# Patient Record
Sex: MALE | Race: WHITE | HISPANIC OR LATINO | Employment: FULL TIME | URBAN - METROPOLITAN AREA
[De-identification: names, ages, dates, MRNs, and addresses within clinical notes are randomized per-mention and may not be internally consistent; named-entity substitution may affect disease eponyms.]

---

## 2017-01-06 ENCOUNTER — OFFICE VISIT (OUTPATIENT)
Dept: INTERNAL MEDICINE | Facility: CLINIC | Age: 57
End: 2017-01-06
Payer: COMMERCIAL

## 2017-01-06 VITALS
TEMPERATURE: 97 F | SYSTOLIC BLOOD PRESSURE: 160 MMHG | BODY MASS INDEX: 28.87 KG/M2 | WEIGHT: 173.31 LBS | HEART RATE: 85 BPM | OXYGEN SATURATION: 97 % | DIASTOLIC BLOOD PRESSURE: 100 MMHG | HEIGHT: 65 IN

## 2017-01-06 DIAGNOSIS — I10 ESSENTIAL HYPERTENSION: Primary | ICD-10-CM

## 2017-01-06 DIAGNOSIS — Z00.00 PREVENTATIVE HEALTH CARE: ICD-10-CM

## 2017-01-06 DIAGNOSIS — B35.3 TINEA PEDIS OF BOTH FEET: ICD-10-CM

## 2017-01-06 PROCEDURE — 99213 OFFICE O/P EST LOW 20 MIN: CPT | Mod: S$GLB,,, | Performed by: INTERNAL MEDICINE

## 2017-01-06 PROCEDURE — 3080F DIAST BP >= 90 MM HG: CPT | Mod: S$GLB,,, | Performed by: INTERNAL MEDICINE

## 2017-01-06 PROCEDURE — 1159F MED LIST DOCD IN RCRD: CPT | Mod: S$GLB,,, | Performed by: INTERNAL MEDICINE

## 2017-01-06 PROCEDURE — 99999 PR PBB SHADOW E&M-EST. PATIENT-LVL III: CPT | Mod: PBBFAC,,, | Performed by: INTERNAL MEDICINE

## 2017-01-06 PROCEDURE — 3077F SYST BP >= 140 MM HG: CPT | Mod: S$GLB,,, | Performed by: INTERNAL MEDICINE

## 2017-01-06 RX ORDER — AMLODIPINE BESYLATE 10 MG/1
10 TABLET ORAL DAILY
Qty: 30 TABLET | Refills: 1 | Status: SHIPPED | OUTPATIENT
Start: 2017-01-06 | End: 2018-01-05

## 2017-01-06 RX ORDER — HYDROCHLOROTHIAZIDE 12.5 MG/1
12.5 CAPSULE ORAL DAILY
Qty: 30 CAPSULE | Refills: 1 | Status: SHIPPED | OUTPATIENT
Start: 2017-01-06 | End: 2018-01-05

## 2017-01-06 RX ORDER — CICLOPIROX OLAMINE 7.7 MG/G
CREAM TOPICAL 2 TIMES DAILY
Qty: 90 G | Refills: 2 | Status: SHIPPED | OUTPATIENT
Start: 2017-01-06 | End: 2020-02-21 | Stop reason: SDUPTHER

## 2017-01-06 NOTE — MR AVS SNAPSHOT
[facility]  [address] [facility] [place] [address]  Phone: [phone]  Fax: [phone]                  David Salter   2017 1:40 PM   Office Visit    Description:  Male : 1960   Provider:  Pardeep Rodriguez MD   Department:  [facility] - Internal Medicine           Reason for Visit     Hypertension     Headache           Diagnoses this Visit        Comments    Essential hypertension    -  Primary     Tinea pedis of both feet         Preventative health care                To Do List           Future Appointments        Provider Department Dept Phone    2017 8:20 AM LABORATORY, [facility] [phone]    2017 1:40 PM Pardeep Rodriguez MD [facility] [phone]      Goals (5 Years of Data)     None      Follow-Up and Disposition     Return in about 2 weeks (around 2017), or if symptoms worsen or fail to improve.       These Medications        Disp Refills Start End    amlodipine (NORVASC) 10 MG tablet 30 tablet 1 2017    Take 1 tablet (10 mg total) by mouth once daily. - Oral    Pharmacy: [facility] [address] [place], [place] [address] - [address] AT [facility] Ph #: [phone]       hydrochlorothiazide (MICROZIDE) 12.5 mg capsule 30 capsule 1 2017    Take 1 capsule (12.5 mg total) by mouth once daily. - Oral    Pharmacy: [facility] [address] [place], [place] [address] - [address] [address] AT [facility] Ph #: [phone]       ciclopirox (LOPROX) 0.77 % Crea 90 g 2 2017     Apply topically 2 (two) times daily. - Topical (Top)    Pharmacy: [facility] [address] [place], [place] [address] - [address] [address] AT [facility] Ph #: [phone]         [facility] On [facility]     [facility] Care [facility] -  Assistance  Registered nurses in the [facility] provide clinical advisement, health education, appointment booking, and other advisory services.  Call for  this free service at [phone].             Medications           Message regarding Medications     Verify the changes and/or additions to your medication regime listed below are the same as discussed with your clinician today.  If any of these changes or additions are incorrect, please notify your healthcare provider.        START taking these NEW medications        Refills    amlodipine (NORVASC) 10 MG tablet 1    Sig: Take 1 tablet (10 mg total) by mouth once daily.    Class: Normal    Route: Oral    hydrochlorothiazide (MICROZIDE) 12.5 mg capsule 1    Sig: Take 1 capsule (12.5 mg total) by mouth once daily.    Class: Normal    Route: Oral      CHANGE how you are taking these medications     Start Taking Instead of    ciclopirox (LOPROX) 0.77 % Crea ciclopirox (LOPROX) 0.77 % Crea    Dosage:  Apply topically 2 (two) times daily. Dosage:  Apply topically 2 (two) times daily.    Reason for Change:  Patient no longer taking       STOP taking these medications     esomeprazole (NEXIUM) 40 MG capsule Take 1 capsule (40 mg total) by mouth before breakfast.    fluticasone (FLONASE) 50 mcg/actuation nasal spray 1 spray by Each Nare route once daily.           Verify that the below list of medications is an accurate representation of the medications you are currently taking.  If none reported, the list may be blank. If incorrect, please contact your healthcare provider. Carry this list with you in case of emergency.           Current Medications     losartan (COZAAR) 25 MG tablet Take 50 mg by mouth once daily.     amlodipine (NORVASC) 10 MG tablet Take 1 tablet (10 mg total) by mouth once daily.    ciclopirox (LOPROX) 0.77 % Crea Apply topically 2 (two) times daily.    hydrochlorothiazide (MICROZIDE) 12.5 mg capsule Take 1 capsule (12.5 mg total) by mouth once daily.           Clinical Reference Information           Vital Signs - Last Recorded  Most recent update: 1/6/2017  2:02 PM by Karla Be MA    BP  "Pulse Temp Ht Wt SpO2    (!) 160/100 (BP Location: Right arm, Patient Position: Sitting) 85 97.2 °F (36.2 °C) (Tympanic) 5' 5" (1.651 m) 78.6 kg (173 lb 4.5 oz) 97%    BMI                28.84 kg/m2          Blood Pressure          Most Recent Value    BP  (!)  160/100      Allergies as of 1/6/2017     No Known Allergies      Immunizations Administered on Date of Encounter - 1/6/2017     None      Orders Placed During Today's Visit     Future Labs/Procedures Expected by Expires    CBC auto differential  1/6/2017 3/7/2018    Comprehensive metabolic panel  1/6/2017 3/7/2018    Lipid panel  1/6/2017 3/7/2018    PSA, Screening  1/6/2017 3/7/2018    TSH  1/6/2017 3/7/2018    Vitamin D  1/6/2017 3/7/2018      [facility] Sign-Up     Activating your [facility] account is as easy as 1-2-3!     1) Visit [email or web address], select Sign Up Now, enter this activation code and your date of birth, then select Next.  [email or web address]  Expires: 2/20/2017  2:50 PM      2) Create a username and password to use when you visit [facility] in the future and select a security question in case you lose your password and select Next.    3) Enter your e-mail address and click Sign Up!    Additional Information  If you have questions, please e-mail [email or web address] or call [phone] to talk to our [facility] staff. Remember, [facility] is NOT to be used for urgent needs. For medical emergencies, dial 911.         "

## 2017-01-06 NOTE — PROGRESS NOTES
"Subjective:      Patient ID: David Salter is a 56 y.o. male.    Chief Complaint: Hypertension (elevated x 2-3 weeks) and Headache    HPI Comments: 57 yo with Patient Active Problem List:     Essential hypertension     Vitamin D deficiency     Elevated BP     Gastroesophageal reflux disease    Here today c/o increased bp over past 2 weeks. Highest 169/104. Tried to increase losartan to 50 mg once daily. bp and elevated bp resolves with 50 mg losartan but then returns at end of day. No cp, syncope, blurred vision.  She also reports that a Loprox improved his tinea pedis in the past but flared again when ran out of cream.    Hypertension   Associated symptoms include headaches. Pertinent negatives include no chest pain, palpitations or shortness of breath.   Headache    Pertinent negatives include no abdominal pain, coughing, dizziness, ear pain, eye pain, eye redness, fever, numbness, photophobia, seizures, sore throat or weakness.     Review of Systems   Constitutional: Negative for chills and fever.   HENT: Negative for ear pain and sore throat.    Eyes: Negative for photophobia, pain, redness and visual disturbance.   Respiratory: Negative for cough, shortness of breath and wheezing.    Cardiovascular: Negative for chest pain and palpitations.   Gastrointestinal: Negative for abdominal pain and blood in stool.   Genitourinary: Negative for dysuria and hematuria.   Neurological: Positive for headaches. Negative for dizziness, tremors, seizures, syncope, facial asymmetry, weakness and numbness.     Objective:     Visit Vitals    BP (!) 160/100 (BP Location: Right arm, Patient Position: Sitting)    Pulse 85    Temp 97.2 °F (36.2 °C) (Tympanic)    Ht 5' 5" (1.651 m)    Wt 78.6 kg (173 lb 4.5 oz)    SpO2 97%    BMI 28.84 kg/m2       Physical Exam   Constitutional: He is oriented to person, place, and time. He appears well-developed and well-nourished. No distress.   HENT:   Head: Normocephalic and " atraumatic.   Mouth/Throat: Oropharynx is clear and moist.   Eyes: EOM are normal. Pupils are equal, round, and reactive to light.   Neck: Neck supple. No thyromegaly present.   Cardiovascular: Normal rate and regular rhythm.    Pulmonary/Chest: Breath sounds normal. He has no wheezes. He has no rales.   Abdominal: Soft. Bowel sounds are normal. There is no tenderness.   Musculoskeletal: He exhibits no edema.   Lymphadenopathy:     He has no cervical adenopathy.   Neurological: He is alert and oriented to person, place, and time. He displays normal reflexes. No cranial nerve deficit. He exhibits normal muscle tone. Coordination normal.   Skin: Skin is warm and dry.   Psychiatric: He has a normal mood and affect. His behavior is normal.     Tinea pedis noted to both feet  Assessment:     1. Essential hypertension    2. Tinea pedis of both feet    3. Preventative health care      Plan:   Essential hypertension  -     amlodipine (NORVASC) 10 MG tablet; Take 1 tablet (10 mg total) by mouth once daily.  Dispense: 30 tablet; Refill: 1  -     hydrochlorothiazide (MICROZIDE) 12.5 mg capsule; Take 1 capsule (12.5 mg total) by mouth once daily.  Dispense: 30 capsule; Refill: 1    Tinea pedis of both feet  -     ciclopirox (LOPROX) 0.77 % Crea; Apply topically 2 (two) times daily.  Dispense: 90 g; Refill: 2    Preventative health care  -     Lipid panel; Future; Expected date: 1/6/17  -     PSA, Screening; Future; Expected date: 1/6/17  -     TSH; Future; Expected date: 1/6/17  -     Vitamin D; Future; Expected date: 1/6/17  -     Comprehensive metabolic panel; Future; Expected date: 1/6/17  -     CBC auto differential; Future; Expected date: 1/6/17      Er precautions given        Return in about 2 weeks (around 1/20/2017), or if symptoms worsen or fail to improve.

## 2017-01-13 RX ORDER — LOSARTAN POTASSIUM 25 MG/1
TABLET ORAL
Qty: 90 TABLET | Refills: 0 | Status: SHIPPED | OUTPATIENT
Start: 2017-01-13 | End: 2018-01-05

## 2017-01-14 ENCOUNTER — LAB VISIT (OUTPATIENT)
Dept: LAB | Facility: HOSPITAL | Age: 57
End: 2017-01-14
Attending: INTERNAL MEDICINE
Payer: COMMERCIAL

## 2017-01-14 DIAGNOSIS — Z00.00 PREVENTATIVE HEALTH CARE: ICD-10-CM

## 2017-01-14 LAB
25(OH)D3+25(OH)D2 SERPL-MCNC: 14 NG/ML
ALBUMIN SERPL BCP-MCNC: 3.4 G/DL
ALP SERPL-CCNC: 85 U/L
ALT SERPL W/O P-5'-P-CCNC: 38 U/L
ANION GAP SERPL CALC-SCNC: 8 MMOL/L
AST SERPL-CCNC: 28 U/L
BASOPHILS # BLD AUTO: 0.02 K/UL
BASOPHILS NFR BLD: 0.4 %
BILIRUB SERPL-MCNC: 0.7 MG/DL
BUN SERPL-MCNC: 18 MG/DL
CALCIUM SERPL-MCNC: 8.7 MG/DL
CHLORIDE SERPL-SCNC: 104 MMOL/L
CHOLEST/HDLC SERPL: 5.6 {RATIO}
CO2 SERPL-SCNC: 28 MMOL/L
COMPLEXED PSA SERPL-MCNC: 0.54 NG/ML
CREAT SERPL-MCNC: 0.9 MG/DL
DIFFERENTIAL METHOD: ABNORMAL
EOSINOPHIL # BLD AUTO: 0.1 K/UL
EOSINOPHIL NFR BLD: 1.2 %
ERYTHROCYTE [DISTWIDTH] IN BLOOD BY AUTOMATED COUNT: 12.7 %
EST. GFR  (AFRICAN AMERICAN): >60 ML/MIN/1.73 M^2
EST. GFR  (NON AFRICAN AMERICAN): >60 ML/MIN/1.73 M^2
GLUCOSE SERPL-MCNC: 98 MG/DL
HCT VFR BLD AUTO: 42.6 %
HDL/CHOLESTEROL RATIO: 17.8 %
HDLC SERPL-MCNC: 259 MG/DL
HDLC SERPL-MCNC: 46 MG/DL
HGB BLD-MCNC: 14.7 G/DL
LDLC SERPL CALC-MCNC: 184.2 MG/DL
LYMPHOCYTES # BLD AUTO: 2.3 K/UL
LYMPHOCYTES NFR BLD: 40.4 %
MCH RBC QN AUTO: 31.5 PG
MCHC RBC AUTO-ENTMCNC: 34.5 %
MCV RBC AUTO: 91 FL
MONOCYTES # BLD AUTO: 0.5 K/UL
MONOCYTES NFR BLD: 8.4 %
NEUTROPHILS # BLD AUTO: 2.8 K/UL
NEUTROPHILS NFR BLD: 49.4 %
NONHDLC SERPL-MCNC: 213 MG/DL
PLATELET # BLD AUTO: 211 K/UL
PMV BLD AUTO: 12.4 FL
POTASSIUM SERPL-SCNC: 3.6 MMOL/L
PROT SERPL-MCNC: 7.4 G/DL
RBC # BLD AUTO: 4.67 M/UL
SODIUM SERPL-SCNC: 140 MMOL/L
TRIGL SERPL-MCNC: 144 MG/DL
TSH SERPL DL<=0.005 MIU/L-ACNC: 0.58 UIU/ML
WBC # BLD AUTO: 5.62 K/UL

## 2017-01-14 PROCEDURE — 36415 COLL VENOUS BLD VENIPUNCTURE: CPT | Mod: PO

## 2017-01-14 PROCEDURE — 84153 ASSAY OF PSA TOTAL: CPT

## 2017-01-14 PROCEDURE — 85025 COMPLETE CBC W/AUTO DIFF WBC: CPT

## 2017-01-14 PROCEDURE — 80053 COMPREHEN METABOLIC PANEL: CPT

## 2017-01-14 PROCEDURE — 80061 LIPID PANEL: CPT

## 2017-01-14 PROCEDURE — 84443 ASSAY THYROID STIM HORMONE: CPT

## 2017-01-14 PROCEDURE — 82306 VITAMIN D 25 HYDROXY: CPT

## 2017-12-31 RX ORDER — LOSARTAN POTASSIUM 25 MG/1
TABLET ORAL
Qty: 90 TABLET | Refills: 0 | Status: SHIPPED | OUTPATIENT
Start: 2017-12-31 | End: 2018-01-05 | Stop reason: SDUPTHER

## 2018-01-02 NOTE — TELEPHONE ENCOUNTER
Spoke with pt, notified him that medication was refilled x 1 month but due for appointment. Pt scheduled appointment for 1/5/18 at 4:20 pm.

## 2018-01-03 ENCOUNTER — PATIENT OUTREACH (OUTPATIENT)
Dept: ADMINISTRATIVE | Facility: HOSPITAL | Age: 58
End: 2018-01-03

## 2018-01-05 ENCOUNTER — OFFICE VISIT (OUTPATIENT)
Dept: INTERNAL MEDICINE | Facility: CLINIC | Age: 58
End: 2018-01-05
Payer: COMMERCIAL

## 2018-01-05 VITALS
DIASTOLIC BLOOD PRESSURE: 82 MMHG | BODY MASS INDEX: 29.31 KG/M2 | SYSTOLIC BLOOD PRESSURE: 130 MMHG | HEIGHT: 65 IN | HEART RATE: 79 BPM | OXYGEN SATURATION: 97 % | WEIGHT: 175.94 LBS | TEMPERATURE: 98 F

## 2018-01-05 DIAGNOSIS — E55.9 VITAMIN D DEFICIENCY: ICD-10-CM

## 2018-01-05 DIAGNOSIS — Z00.00 ROUTINE GENERAL MEDICAL EXAMINATION AT A HEALTH CARE FACILITY: Primary | ICD-10-CM

## 2018-01-05 DIAGNOSIS — I10 ESSENTIAL HYPERTENSION: ICD-10-CM

## 2018-01-05 PROCEDURE — 99396 PREV VISIT EST AGE 40-64: CPT | Mod: S$GLB,,, | Performed by: INTERNAL MEDICINE

## 2018-01-05 PROCEDURE — 99999 PR PBB SHADOW E&M-EST. PATIENT-LVL III: CPT | Mod: PBBFAC,,, | Performed by: INTERNAL MEDICINE

## 2018-01-05 RX ORDER — LOSARTAN POTASSIUM 25 MG/1
25 TABLET ORAL DAILY
Qty: 90 TABLET | Refills: 3 | Status: SHIPPED | OUTPATIENT
Start: 2018-01-05 | End: 2018-09-24 | Stop reason: SDUPTHER

## 2018-01-06 ENCOUNTER — LAB VISIT (OUTPATIENT)
Dept: LAB | Facility: HOSPITAL | Age: 58
End: 2018-01-06
Attending: INTERNAL MEDICINE
Payer: COMMERCIAL

## 2018-01-06 DIAGNOSIS — Z00.00 ROUTINE GENERAL MEDICAL EXAMINATION AT A HEALTH CARE FACILITY: ICD-10-CM

## 2018-01-06 LAB
25(OH)D3+25(OH)D2 SERPL-MCNC: 10 NG/ML
ALBUMIN SERPL BCP-MCNC: 3.3 G/DL
ALP SERPL-CCNC: 109 U/L
ALT SERPL W/O P-5'-P-CCNC: 32 U/L
ANION GAP SERPL CALC-SCNC: 10 MMOL/L
AST SERPL-CCNC: 24 U/L
BASOPHILS # BLD AUTO: 0.03 K/UL
BASOPHILS NFR BLD: 0.5 %
BILIRUB SERPL-MCNC: 0.5 MG/DL
BUN SERPL-MCNC: 14 MG/DL
CALCIUM SERPL-MCNC: 9 MG/DL
CHLORIDE SERPL-SCNC: 105 MMOL/L
CHOLEST SERPL-MCNC: 312 MG/DL
CHOLEST/HDLC SERPL: 5.5 {RATIO}
CO2 SERPL-SCNC: 26 MMOL/L
COMPLEXED PSA SERPL-MCNC: 0.52 NG/ML
CREAT SERPL-MCNC: 1 MG/DL
DIFFERENTIAL METHOD: NORMAL
EOSINOPHIL # BLD AUTO: 0.1 K/UL
EOSINOPHIL NFR BLD: 1.3 %
ERYTHROCYTE [DISTWIDTH] IN BLOOD BY AUTOMATED COUNT: 13.2 %
EST. GFR  (AFRICAN AMERICAN): >60 ML/MIN/1.73 M^2
EST. GFR  (NON AFRICAN AMERICAN): >60 ML/MIN/1.73 M^2
GLUCOSE SERPL-MCNC: 104 MG/DL
HCT VFR BLD AUTO: 47.3 %
HDLC SERPL-MCNC: 57 MG/DL
HDLC SERPL: 18.3 %
HGB BLD-MCNC: 15.5 G/DL
IMM GRANULOCYTES # BLD AUTO: 0.02 K/UL
IMM GRANULOCYTES NFR BLD AUTO: 0.3 %
LDLC SERPL CALC-MCNC: 210 MG/DL
LYMPHOCYTES # BLD AUTO: 2.2 K/UL
LYMPHOCYTES NFR BLD: 36.8 %
MCH RBC QN AUTO: 30.5 PG
MCHC RBC AUTO-ENTMCNC: 32.8 G/DL
MCV RBC AUTO: 93 FL
MONOCYTES # BLD AUTO: 0.4 K/UL
MONOCYTES NFR BLD: 6.8 %
NEUTROPHILS # BLD AUTO: 3.3 K/UL
NEUTROPHILS NFR BLD: 54.3 %
NONHDLC SERPL-MCNC: 255 MG/DL
NRBC BLD-RTO: 0 /100 WBC
PLATELET # BLD AUTO: 217 K/UL
PMV BLD AUTO: 11.4 FL
POTASSIUM SERPL-SCNC: 4.5 MMOL/L
PROT SERPL-MCNC: 7.9 G/DL
RBC # BLD AUTO: 5.09 M/UL
SODIUM SERPL-SCNC: 141 MMOL/L
TRIGL SERPL-MCNC: 225 MG/DL
TSH SERPL DL<=0.005 MIU/L-ACNC: 2 UIU/ML
WBC # BLD AUTO: 6.01 K/UL

## 2018-01-06 PROCEDURE — 36415 COLL VENOUS BLD VENIPUNCTURE: CPT | Mod: PO

## 2018-01-06 PROCEDURE — 80061 LIPID PANEL: CPT

## 2018-01-06 PROCEDURE — 84443 ASSAY THYROID STIM HORMONE: CPT

## 2018-01-06 PROCEDURE — 82306 VITAMIN D 25 HYDROXY: CPT

## 2018-01-06 PROCEDURE — 84153 ASSAY OF PSA TOTAL: CPT

## 2018-01-06 PROCEDURE — 85025 COMPLETE CBC W/AUTO DIFF WBC: CPT

## 2018-01-06 PROCEDURE — 80053 COMPREHEN METABOLIC PANEL: CPT

## 2018-01-07 NOTE — PROGRESS NOTES
"Subjective:      Patient ID: David Salter is a 57 y.o. male.    Chief Complaint: Annual Exam    58 yo with Patient Active Problem List:     Essential hypertension     Vitamin D deficiency     Gastroesophageal reflux disease    Past Medical History:  No date: Hypertension    Here today for annual exam.  He is feeling well today and in his usual state of health.  He reports compliance with his medications without significant side effects.  He is a nonsmoker.    Social History    Marital status:              Spouse name:                       Years of education:                 Number of children:               Occupational History    None on file    Social History Main Topics    Smoking status: Never Smoker                                                                Smokeless tobacco: Never Used                        Alcohol use: Yes           3.0 oz/week       Cans of beer: 5 per week    Drug use: No              Sexual activity: Not on file          Other Topics            Concern    None on file    Social History Narrative    None on file      family history includes Diabetes in his father.        Review of Systems   Constitutional: Negative for chills and fever.   HENT: Negative for ear pain and sore throat.    Respiratory: Negative for cough.    Cardiovascular: Negative for chest pain.   Gastrointestinal: Negative for abdominal pain and blood in stool.   Genitourinary: Negative for dysuria and hematuria.   Neurological: Negative for seizures and syncope.     Objective:   /82 (BP Location: Right arm, Patient Position: Sitting)   Pulse 79   Temp 97.5 °F (36.4 °C) (Tympanic)   Ht 5' 5" (1.651 m)   Wt 79.8 kg (175 lb 14.8 oz)   SpO2 97%   BMI 29.28 kg/m²     Physical Exam   Constitutional: He is oriented to person, place, and time. He appears well-developed and well-nourished. No distress.   HENT:   Head: Normocephalic and atraumatic.   Mouth/Throat: Oropharynx is clear and moist. "   Eyes: EOM are normal. Pupils are equal, round, and reactive to light.   Neck: Neck supple. No thyromegaly present.   Cardiovascular: Normal rate and regular rhythm.    Pulmonary/Chest: Breath sounds normal. He has no wheezes. He has no rales.   Abdominal: Soft. Bowel sounds are normal. There is no tenderness.   Lymphadenopathy:     He has no cervical adenopathy.   Neurological: He is alert and oriented to person, place, and time.   Skin: Skin is warm and dry.   Psychiatric: He has a normal mood and affect. His behavior is normal.       Assessment:     1. Routine general medical examination at a health care facility    2. Essential hypertension    3. Vitamin D deficiency      Plan:   Routine general medical examination at a health care facility  -     Comprehensive metabolic panel; Future; Expected date: 01/05/2018  -     CBC auto differential; Future; Expected date: 01/05/2018  -     TSH; Future; Expected date: 01/05/2018  -     Lipid panel; Future; Expected date: 01/05/2018  -     Vitamin D; Future; Expected date: 01/05/2018  -     PSA, Screening; Future; Expected date: 01/05/2018    Essential hypertension  -     losartan (COZAAR) 25 MG tablet; Take 1 tablet (25 mg total) by mouth once daily.  Dispense: 90 tablet; Refill: 3    Vitamin D deficiency  Comments:  taking an mvi and vit d , unsure of units      Your lab results are acceptable. Please let us know if you have any questions.  HM reviewed with pt.      Problem List Items Addressed This Visit        Cardiac/Vascular    Essential hypertension    Relevant Medications    losartan (COZAAR) 25 MG tablet       Endocrine    Vitamin D deficiency      Other Visit Diagnoses     Routine general medical examination at a health care facility    -  Primary    Relevant Orders    Comprehensive metabolic panel (Completed)    CBC auto differential (Completed)    TSH (Completed)    Lipid panel (Completed)    Vitamin D (Completed)    PSA, Screening (Completed)          No  Follow-up on file.

## 2018-02-23 ENCOUNTER — HOSPITAL ENCOUNTER (OUTPATIENT)
Dept: RADIOLOGY | Facility: HOSPITAL | Age: 58
Discharge: HOME | End: 2018-02-23
Attending: NURSE PRACTITIONER
Payer: COMMERCIAL

## 2018-02-23 ENCOUNTER — OFFICE VISIT (OUTPATIENT)
Dept: INTERNAL MEDICINE | Facility: CLINIC | Age: 58
End: 2018-02-23
Payer: COMMERCIAL

## 2018-02-23 ENCOUNTER — TELEPHONE (OUTPATIENT)
Dept: INTERNAL MEDICINE | Facility: CLINIC | Age: 58
End: 2018-02-23

## 2018-02-23 VITALS
TEMPERATURE: 97 F | OXYGEN SATURATION: 98 % | HEIGHT: 65 IN | DIASTOLIC BLOOD PRESSURE: 86 MMHG | SYSTOLIC BLOOD PRESSURE: 134 MMHG | BODY MASS INDEX: 28.54 KG/M2 | WEIGHT: 171.31 LBS | HEART RATE: 64 BPM

## 2018-02-23 DIAGNOSIS — R19.8 ABDOMINAL FULLNESS: ICD-10-CM

## 2018-02-23 DIAGNOSIS — K59.00 CONSTIPATION, UNSPECIFIED CONSTIPATION TYPE: ICD-10-CM

## 2018-02-23 DIAGNOSIS — R10.9 ABDOMINAL PAIN, UNSPECIFIED ABDOMINAL LOCATION: ICD-10-CM

## 2018-02-23 DIAGNOSIS — R10.9 ABDOMINAL PAIN, UNSPECIFIED ABDOMINAL LOCATION: Primary | ICD-10-CM

## 2018-02-23 PROCEDURE — 74019 RADEX ABDOMEN 2 VIEWS: CPT | Mod: TC,FY,PO

## 2018-02-23 PROCEDURE — 99214 OFFICE O/P EST MOD 30 MIN: CPT | Mod: SA,S$GLB,, | Performed by: NURSE PRACTITIONER

## 2018-02-23 PROCEDURE — 74019 RADEX ABDOMEN 2 VIEWS: CPT | Mod: 26,,, | Performed by: RADIOLOGY

## 2018-02-23 PROCEDURE — 99999 PR PBB SHADOW E&M-EST. PATIENT-LVL IV: CPT | Mod: PBBFAC,,, | Performed by: NURSE PRACTITIONER

## 2018-02-23 PROCEDURE — 3008F BODY MASS INDEX DOCD: CPT | Mod: S$GLB,,, | Performed by: NURSE PRACTITIONER

## 2018-02-23 RX ORDER — LACTULOSE 10 G/15ML
10 SOLUTION ORAL 2 TIMES DAILY
Qty: 100 ML | Refills: 0 | Status: SHIPPED | OUTPATIENT
Start: 2018-02-23 | End: 2018-02-26

## 2018-02-23 NOTE — TELEPHONE ENCOUNTER
Pt requested appt with Dr. Rodriguez re: abdominal pain.  Notified pt that Dr. Rodriguez is booked today but can get appt with NP Kadie Carpenter at 1:00 pm.  Pt accepted appt.

## 2018-02-23 NOTE — TELEPHONE ENCOUNTER
----- Message from Ascencion Shaver sent at 2/23/2018  7:08 AM CST -----  Contact: pt   States he's calling rg wanting to know if he can be worked in today to see Dr for stomach pain/ follow up vitamin D and can be reached at [phone]/candi/dbw

## 2018-02-23 NOTE — PROGRESS NOTES
Subjective:       Patient ID: David Salter is a 57 y.o. male.    Chief Complaint: Abdominal Pain (x 2days, no BM last 3 days)    Patient presents with abdominal pain about 2 days ago.  Reports mild nausea and abdominal fullness.  Describes as aching.  Last bowel movement 3 days ago.  Denies history of constipation.  No change in appetite.        Review of Systems   Constitutional: Negative for appetite change, chills and fatigue.   Respiratory: Negative for shortness of breath.    Cardiovascular: Negative for chest pain.   Gastrointestinal: Positive for abdominal pain, constipation and nausea. Negative for diarrhea.   Psychiatric/Behavioral: Negative for agitation and confusion.       Objective:      Physical Exam   Constitutional: He is oriented to person, place, and time. Vital signs are normal. He appears well-developed and well-nourished.   HENT:   Head: Normocephalic and atraumatic.   Neck: Normal range of motion.   Cardiovascular: Normal rate and regular rhythm.    Pulmonary/Chest: Effort normal and breath sounds normal.   Abdominal: Soft. There is generalized tenderness.   Musculoskeletal: Normal range of motion.   Neurological: He is alert and oriented to person, place, and time.   Skin: Skin is warm.   Psychiatric: He has a normal mood and affect. His behavior is normal.       Assessment:       1. Abdominal pain, unspecified abdominal location    2. Abdominal fullness    3. Constipation, unspecified constipation type        Plan:         Abdominal pain, unspecified abdominal location  -     X-Ray Abdomen Flat And Erect; Future; Expected date: 02/23/2018    Abdominal fullness  -     X-Ray Abdomen Flat And Erect; Future; Expected date: 02/23/2018  -     lactulose (CHRONULAC) 20 gram/30 mL Soln; Take 15 mLs (10 g total) by mouth 2 (two) times daily.  Dispense: 100 mL; Refill: 0    Constipation, unspecified constipation type  -     lactulose (CHRONULAC) 20 gram/30 mL Soln; Take 15 mLs (10 g total) by  mouth 2 (two) times daily.  Dispense: 100 mL; Refill: 0        Instructed to take medications as ordered.  Instructed to increase water, fruits, vegetables, and fiber.  If no improvement, follow up.  Will schedule follow up with discuss labs with PCP.

## 2018-02-23 NOTE — PATIENT INSTRUCTIONS
Constipation (Adult)  Constipation means that you have bowel movements that are less frequent than usual. Stools often become very hard and difficult to pass.  Constipation is very common. At some point in life it affects almost everyone. Since everyone's bowel habits are different, what is constipation to one person may not be to another. Your healthcare provider may do tests to diagnose constipation. It depends on what he or she finds when evaluating you.    Symptoms of constipation include:  · Abdominal pain  · Bloating  · Vomiting  · Painful bowel movements  · Itching, swelling, bleeding, or pain around the anus  Causes  Constipation can have many causes. These include:  · Diet low in fiber  · Too much dairy  · Not drinking enough liquids  · Lack of exercise or physical activity. This is especially true for older adults.  · Changes in lifestyle or daily routine, including pregnancy, aging, work, and travel  · Frequent use or misuse of laxatives  · Ignoring the urge to have a bowel movement or delaying it until later  · Medicines, such as certain prescription pain medicines, iron supplements, antacids, certain antidepressants, and calcium supplements  · Diseases like irritable bowel syndrome, bowel obstructions, stroke, diabetes, thyroid disease, Parkinson disease, hemorrhoids, and colon cancer  Complications  Potential complications of constipation can include:  · Hemorrhoids  · Rectal bleeding from hemorrhoids or anal fissures (skin tears)  · Hernias  · Dependency on laxatives  · Chronic constipation  · Fecal impaction  · Bowel obstruction or perforation  Home care  All treatment should be done after talking with your healthcare provider. This is especially true if you have another medical problems, are taking prescription medicines, or are an older adult. Treatment most often involves lifestyle changes. You may also need medicines. Your healthcare provider will tell you which will work best for you. Follow  the advice below to help avoid this problem in the future.  Lifestyle changes  These lifestyle changes can help prevent constipation:  · Diet. Eat a high-fiber diet, with fresh fruit and vegetables, and reduce dairy intake, meats, and processed foods  · Fluids. It's important to get enough fluids each day. Drink plenty of water when you eat more fiber. If you are on diet that limits the amount of fluid you can have, talk about this with your healthcare provider.  · Regular exercise. Check with your healthcare provider first.  Medications  Take any medicines as directed. Some laxatives are safe to use only every now and then. Others can be taken on a regular basis. Talk with your doctor or pharmacist if you have questions.  Prescription pain medicines can cause constipation. If you are taking this kind of medicine, ask your healthcare provider if you should also take a stool softener.  Medicines you may take to treat constipation include:  · Fiber supplements  · Stool softeners  · Laxatives  · Enemas  · Rectal suppositories  Follow-up care  Follow up with your healthcare provider if symptoms don't get better in the next few days. You may need to have more tests or see a specialist.  Call 911  Call 911 if any of these occur:  · Trouble breathing  · Stiff, rigid abdomen that is severely painful to touch  · Confusion  · Fainting or loss of consciousness  · Rapid heart rate  · Chest pain  When to seek medical advice  Call your healthcare provider right away if any of these occur:  · Fever over 100.4°F (38°C)  · Failure to resume normal bowel movements  · Pain in your abdomen or back gets worse  · Nausea or vomiting  · Swelling in your abdomen  · Blood in the stool  · Black, tarry stool  · Involuntary weight loss  · Weakness  Date Last Reviewed: 12/30/2015  © 9707-2075 [employer] [address] [place] [address], [place] [address]. All rights reserved. This information is not intended as a substitute for  professional medical care. Always follow your healthcare professional's instructions.

## 2018-05-04 ENCOUNTER — OFFICE VISIT (OUTPATIENT)
Dept: INTERNAL MEDICINE | Facility: CLINIC | Age: 58
End: 2018-05-04
Payer: COMMERCIAL

## 2018-05-04 ENCOUNTER — LAB VISIT (OUTPATIENT)
Dept: LAB | Facility: HOSPITAL | Age: 58
End: 2018-05-04
Attending: INTERNAL MEDICINE
Payer: COMMERCIAL

## 2018-05-04 VITALS
OXYGEN SATURATION: 98 % | HEIGHT: 65 IN | WEIGHT: 172.63 LBS | BODY MASS INDEX: 28.76 KG/M2 | DIASTOLIC BLOOD PRESSURE: 86 MMHG | HEART RATE: 68 BPM | SYSTOLIC BLOOD PRESSURE: 136 MMHG | TEMPERATURE: 96 F

## 2018-05-04 DIAGNOSIS — R10.9 FLANK PAIN: ICD-10-CM

## 2018-05-04 DIAGNOSIS — R10.9 ABDOMINAL PAIN, UNSPECIFIED ABDOMINAL LOCATION: ICD-10-CM

## 2018-05-04 DIAGNOSIS — E78.5 HYPERLIPIDEMIA, UNSPECIFIED HYPERLIPIDEMIA TYPE: Primary | ICD-10-CM

## 2018-05-04 DIAGNOSIS — E55.9 VITAMIN D INSUFFICIENCY: ICD-10-CM

## 2018-05-04 LAB
BILIRUB UR QL STRIP: NEGATIVE
CLARITY UR: CLEAR
COLOR UR: YELLOW
GLUCOSE UR QL STRIP: NEGATIVE
HGB UR QL STRIP: NEGATIVE
KETONES UR QL STRIP: NEGATIVE
LEUKOCYTE ESTERASE UR QL STRIP: NEGATIVE
NITRITE UR QL STRIP: NEGATIVE
PH UR STRIP: 6 [PH] (ref 5–8)
PROT UR QL STRIP: NEGATIVE
SP GR UR STRIP: >=1.03 (ref 1–1.03)
URN SPEC COLLECT METH UR: ABNORMAL

## 2018-05-04 PROCEDURE — 81003 URINALYSIS AUTO W/O SCOPE: CPT | Mod: PO

## 2018-05-04 PROCEDURE — 99214 OFFICE O/P EST MOD 30 MIN: CPT | Mod: S$GLB,,, | Performed by: INTERNAL MEDICINE

## 2018-05-04 PROCEDURE — 3079F DIAST BP 80-89 MM HG: CPT | Mod: CPTII,S$GLB,, | Performed by: INTERNAL MEDICINE

## 2018-05-04 PROCEDURE — 3075F SYST BP GE 130 - 139MM HG: CPT | Mod: CPTII,S$GLB,, | Performed by: INTERNAL MEDICINE

## 2018-05-04 PROCEDURE — 3008F BODY MASS INDEX DOCD: CPT | Mod: CPTII,S$GLB,, | Performed by: INTERNAL MEDICINE

## 2018-05-04 PROCEDURE — 99999 PR PBB SHADOW E&M-EST. PATIENT-LVL III: CPT | Mod: PBBFAC,,, | Performed by: INTERNAL MEDICINE

## 2018-05-04 RX ORDER — ESOMEPRAZOLE MAGNESIUM 40 MG/1
40 CAPSULE, DELAYED RELEASE ORAL DAILY
COMMUNITY
Start: 2018-04-30 | End: 2018-09-24

## 2018-05-04 RX ORDER — NAPROXEN 500 MG/1
500 TABLET ORAL 2 TIMES DAILY WITH MEALS
Qty: 30 TABLET | Refills: 0 | Status: SHIPPED | OUTPATIENT
Start: 2018-05-04 | End: 2018-09-24

## 2018-05-04 RX ORDER — DICYCLOMINE HYDROCHLORIDE 10 MG/1
10 CAPSULE ORAL 2 TIMES DAILY
COMMUNITY
End: 2018-09-24

## 2018-05-04 RX ORDER — LACTULOSE 10 G/15ML
SOLUTION ORAL; RECTAL
Refills: 0 | COMMUNITY
Start: 2018-02-23 | End: 2018-05-04

## 2018-05-04 RX ORDER — LOSARTAN POTASSIUM 25 MG/1
25 TABLET ORAL
COMMUNITY
End: 2018-05-04 | Stop reason: SDUPTHER

## 2018-05-04 RX ORDER — HYDROCODONE BITARTRATE AND ACETAMINOPHEN 10; 325 MG/1; MG/1
1 TABLET ORAL
COMMUNITY
Start: 2018-04-30 | End: 2018-05-04

## 2018-05-04 RX ORDER — CYCLOBENZAPRINE HCL 10 MG
TABLET ORAL
Qty: 30 TABLET | Refills: 0 | Status: SHIPPED | OUTPATIENT
Start: 2018-05-04 | End: 2018-09-24

## 2018-05-04 RX ORDER — NAPROXEN 500 MG/1
TABLET ORAL
Qty: 180 TABLET | Refills: 0 | OUTPATIENT
Start: 2018-05-04

## 2018-05-04 RX ORDER — ROSUVASTATIN CALCIUM 10 MG/1
10 TABLET, COATED ORAL DAILY
Qty: 90 TABLET | Refills: 1 | Status: SHIPPED | OUTPATIENT
Start: 2018-05-04 | End: 2020-02-24 | Stop reason: SDUPTHER

## 2018-05-04 NOTE — PROGRESS NOTES
"Subjective:      Patient ID: David Salter is a 57 y.o. male.    Chief Complaint: Abdominal Pain    58 yo with Patient Active Problem List:     Essential hypertension     Vitamin D deficiency     Gastroesophageal reflux disease    Here today for management of mult med problems as outlined below. He is feeling well overall but has had some abd pain. Has seen gi. Mild improvement. Continues with flank pain. Worse with movement. No trauma      Flank Pain   This is a new problem. The current episode started 1 to 4 weeks ago. The problem occurs intermittently. The problem has been waxing and waning since onset. Pain location: right flank. The quality of the pain is described as aching. The pain does not radiate. The pain is moderate. The symptoms are aggravated by bending. Stiffness is present in the morning. Associated symptoms include abdominal pain. Pertinent negatives include no bladder incontinence, bowel incontinence, chest pain, dysuria, fever, leg pain, numbness or paresis. Risk factors include lack of exercise. He has tried nothing for the symptoms. The treatment provided mild relief.     Review of Systems   Constitutional: Negative for chills and fever.   HENT: Negative for ear pain and sore throat.    Respiratory: Negative for cough, shortness of breath and wheezing.    Cardiovascular: Negative for chest pain, palpitations and leg swelling.   Gastrointestinal: Positive for abdominal pain. Negative for blood in stool and bowel incontinence.   Genitourinary: Positive for flank pain. Negative for bladder incontinence, dysuria and hematuria.   Neurological: Negative for seizures, syncope and numbness.     Objective:   /86 (BP Location: Right arm, Patient Position: Sitting)   Pulse 68   Temp 96.2 °F (35.7 °C) (Tympanic)   Ht 5' 5" (1.651 m)   Wt 78.3 kg (172 lb 9.9 oz)   SpO2 98%   BMI 28.73 kg/m²     Physical Exam   Constitutional: He is oriented to person, place, and time. He appears " well-developed and well-nourished. No distress.   HENT:   Head: Normocephalic and atraumatic.   Mouth/Throat: Oropharynx is clear and moist.   Eyes: EOM are normal. Pupils are equal, round, and reactive to light.   Neck: Neck supple. No thyromegaly present.   Cardiovascular: Normal rate and regular rhythm.    Pulmonary/Chest: Breath sounds normal. He has no wheezes. He has no rales.   Abdominal: Soft. Bowel sounds are normal. There is no tenderness.   Musculoskeletal:        Lumbar back: He exhibits normal range of motion, no tenderness, no bony tenderness and no swelling.   No cva ttp   Lymphadenopathy:     He has no cervical adenopathy.   Neurological: He is alert and oriented to person, place, and time.   Skin: Skin is warm and dry.   Psychiatric: He has a normal mood and affect. His behavior is normal.         Assessment:     1. Hyperlipidemia, unspecified hyperlipidemia type    2. Vitamin D insufficiency    3. Abdominal pain, unspecified abdominal location    4. Flank pain      Plan:   Hyperlipidemia, unspecified hyperlipidemia type  -     ALT (SGPT); Future; Expected date: 08/04/2018  -     Lipid panel; Future; Expected date: 08/04/2018  -     rosuvastatin (CRESTOR) 10 MG tablet; Take 1 tablet (10 mg total) by mouth once daily.  Dispense: 90 tablet; Refill: 1    Vitamin D insufficiency    Abdominal pain, unspecified abdominal location  Comments:  has seen GI. bentyl and nexium are helping.     Flank pain  -     Urinalysis; Future; Expected date: 05/04/2018  -     naproxen (NAPROSYN) 500 MG tablet; Take 1 tablet (500 mg total) by mouth 2 (two) times daily with meals. For pain and inflammation  Dispense: 30 tablet; Refill: 0  -     cyclobenzaprine (FLEXERIL) 10 MG tablet; 1/2 to one tab po qhs prn muscle spasm  Dispense: 30 tablet; Refill: 0      Start vitamin D3 over counter at 1000 IU daily.         Problem List Items Addressed This Visit     None      Visit Diagnoses     Hyperlipidemia, unspecified  hyperlipidemia type    -  Primary    Relevant Medications    rosuvastatin (CRESTOR) 10 MG tablet    Other Relevant Orders    ALT (SGPT)    Lipid panel    Vitamin D insufficiency        Abdominal pain, unspecified abdominal location        has seen GI. bentyl and nexium are helping.     Flank pain        Relevant Medications    naproxen (NAPROSYN) 500 MG tablet    cyclobenzaprine (FLEXERIL) 10 MG tablet    Other Relevant Orders    Urinalysis (Completed)          Follow-up in about 3 months (around 8/4/2018), or if symptoms worsen or fail to improve.

## 2018-05-13 RX ORDER — LOSARTAN POTASSIUM 25 MG/1
TABLET ORAL
Qty: 90 TABLET | Refills: 0 | Status: SHIPPED | OUTPATIENT
Start: 2018-05-13 | End: 2018-08-27 | Stop reason: SDUPTHER

## 2018-07-31 ENCOUNTER — PATIENT OUTREACH (OUTPATIENT)
Dept: ADMINISTRATIVE | Facility: HOSPITAL | Age: 58
End: 2018-07-31

## 2018-08-27 RX ORDER — LOSARTAN POTASSIUM 25 MG/1
TABLET ORAL
Qty: 90 TABLET | Refills: 0 | Status: SHIPPED | OUTPATIENT
Start: 2018-08-27 | End: 2018-09-24 | Stop reason: DRUGHIGH

## 2018-09-24 ENCOUNTER — OFFICE VISIT (OUTPATIENT)
Dept: INTERNAL MEDICINE | Facility: CLINIC | Age: 58
End: 2018-09-24
Payer: COMMERCIAL

## 2018-09-24 VITALS
SYSTOLIC BLOOD PRESSURE: 140 MMHG | TEMPERATURE: 98 F | HEIGHT: 65 IN | OXYGEN SATURATION: 99 % | DIASTOLIC BLOOD PRESSURE: 94 MMHG | WEIGHT: 175.94 LBS | BODY MASS INDEX: 29.31 KG/M2 | HEART RATE: 76 BPM

## 2018-09-24 DIAGNOSIS — I10 ESSENTIAL HYPERTENSION: Primary | ICD-10-CM

## 2018-09-24 DIAGNOSIS — H11.31 SUBCONJUNCTIVAL HEMORRHAGE OF RIGHT EYE: ICD-10-CM

## 2018-09-24 DIAGNOSIS — E78.49 OTHER HYPERLIPIDEMIA: ICD-10-CM

## 2018-09-24 DIAGNOSIS — E55.9 VITAMIN D DEFICIENCY: ICD-10-CM

## 2018-09-24 PROCEDURE — 3080F DIAST BP >= 90 MM HG: CPT | Mod: CPTII,S$GLB,, | Performed by: INTERNAL MEDICINE

## 2018-09-24 PROCEDURE — 99214 OFFICE O/P EST MOD 30 MIN: CPT | Mod: S$GLB,,, | Performed by: INTERNAL MEDICINE

## 2018-09-24 PROCEDURE — 3077F SYST BP >= 140 MM HG: CPT | Mod: CPTII,S$GLB,, | Performed by: INTERNAL MEDICINE

## 2018-09-24 PROCEDURE — 99999 PR PBB SHADOW E&M-EST. PATIENT-LVL III: CPT | Mod: PBBFAC,,, | Performed by: INTERNAL MEDICINE

## 2018-09-24 PROCEDURE — 3008F BODY MASS INDEX DOCD: CPT | Mod: CPTII,S$GLB,, | Performed by: INTERNAL MEDICINE

## 2018-09-24 RX ORDER — LOSARTAN POTASSIUM 50 MG/1
50 TABLET ORAL DAILY
Qty: 90 TABLET | Refills: 1 | Status: SHIPPED | OUTPATIENT
Start: 2018-09-24 | End: 2018-11-30 | Stop reason: SDUPTHER

## 2018-09-24 NOTE — PROGRESS NOTES
"Subjective:      Patient ID: David Salter is a 57 y.o. male.    Chief Complaint: Eye Problem (redness to right eye x 1 week)    58 yo with Patient Active Problem List:     Essential hypertension     Vitamin D deficiency     Gastroesophageal reflux disease     Other hyperlipidemia    Past Medical History:  No date: Hypertension    Here today for management of mult med problems and c/o red eye. No trauma. No vision changes. Began after a flight one week ago.  Compliant with meds without significant side effects.       Eye Problem    The right eye is affected. This is a new problem. The current episode started yesterday. The problem occurs constantly. The problem has been gradually worsening. There was no injury mechanism. There is no known exposure to pink eye. He does not wear contacts. Associated symptoms include eye redness. Pertinent negatives include no blurred vision, eye discharge, double vision, fever, foreign body sensation, itching, nausea, photophobia, recent URI or vomiting. He has tried eye drops for the symptoms. The treatment provided no relief.        130s /90s.   Review of Systems   Constitutional: Negative for chills and fever.   HENT: Negative for ear pain and sore throat.    Eyes: Positive for redness. Negative for blurred vision, double vision, photophobia, pain, discharge, itching and visual disturbance.   Respiratory: Negative for cough.    Cardiovascular: Negative for chest pain.   Gastrointestinal: Negative for abdominal pain, blood in stool, nausea and vomiting.   Genitourinary: Negative for dysuria and hematuria.   Neurological: Negative for seizures and syncope.     Objective:   BP (!) 140/94 (BP Location: Right arm, Patient Position: Sitting)   Pulse 76   Temp 98 °F (36.7 °C) (Oral)   Ht 5' 5" (1.651 m)   Wt 79.8 kg (175 lb 14.8 oz)   SpO2 99%   BMI 29.28 kg/m²     Physical Exam   Constitutional: He is oriented to person, place, and time. He appears well-developed and " well-nourished. No distress.   HENT:   Head: Normocephalic and atraumatic.   Mouth/Throat: Oropharynx is clear and moist.   Eyes: EOM and lids are normal. Pupils are equal, round, and reactive to light. Right eye exhibits no discharge and no exudate. No foreign body present in the right eye. Left eye exhibits no discharge and no exudate. No foreign body present in the left eye. Right conjunctiva has a hemorrhage (lighter at nasal ascpect). Left conjunctiva is not injected. Left conjunctiva has no hemorrhage.   Neck: Neck supple. No thyromegaly present.   Cardiovascular: Normal rate and regular rhythm.   Pulmonary/Chest: Breath sounds normal. He has no wheezes. He has no rales.   Abdominal: Soft. Bowel sounds are normal. There is no tenderness.   Lymphadenopathy:     He has no cervical adenopathy.   Neurological: He is alert and oriented to person, place, and time.   Skin: Skin is warm and dry.   Psychiatric: He has a normal mood and affect. His behavior is normal.       Assessment:     1. Essential hypertension    2. Other hyperlipidemia    3. Vitamin D deficiency    4. Subconjunctival hemorrhage of right eye      Plan:   Essential hypertension  Not at goal  Increase losartan to 50  Other hyperlipidemia  stable  Vitamin D deficiency  Cont current meds  Subconjunctival hemorrhage of right eye  See eye MD if no resolution  Other orders  -     losartan (COZAAR) 50 MG tablet; Take 1 tablet (50 mg total) by mouth once daily.  Dispense: 90 tablet; Refill: 1    Need bp check nurse visit in 3 weeks.  Notify me if bp above 139/89.      Lab Frequency Next Occurrence   ALT (SGPT) Once 08/04/2018   Lipid panel Once 08/04/2018       Problem List Items Addressed This Visit        Cardiac/Vascular    Essential hypertension - Primary    Other hyperlipidemia       Endocrine    Vitamin D deficiency      Other Visit Diagnoses     Subconjunctival hemorrhage of right eye              Follow-up in about 4 months (around 1/24/2019), or if  symptoms worsen or fail to improve.

## 2018-09-28 ENCOUNTER — TELEPHONE (OUTPATIENT)
Dept: INTERNAL MEDICINE | Facility: CLINIC | Age: 58
End: 2018-09-28

## 2018-09-28 ENCOUNTER — LAB VISIT (OUTPATIENT)
Dept: LAB | Facility: HOSPITAL | Age: 58
End: 2018-09-28
Attending: INTERNAL MEDICINE
Payer: COMMERCIAL

## 2018-09-28 DIAGNOSIS — E78.5 HYPERLIPIDEMIA, UNSPECIFIED HYPERLIPIDEMIA TYPE: ICD-10-CM

## 2018-09-28 LAB
ALT SERPL W/O P-5'-P-CCNC: 72 U/L
CHOLEST SERPL-MCNC: 316 MG/DL
CHOLEST/HDLC SERPL: 6.1 {RATIO}
HDLC SERPL-MCNC: 52 MG/DL
HDLC SERPL: 16.5 %
LDLC SERPL CALC-MCNC: 214.4 MG/DL
NONHDLC SERPL-MCNC: 264 MG/DL
TRIGL SERPL-MCNC: 248 MG/DL

## 2018-09-28 PROCEDURE — 80061 LIPID PANEL: CPT

## 2018-09-28 PROCEDURE — 36415 COLL VENOUS BLD VENIPUNCTURE: CPT | Mod: PO

## 2018-09-28 PROCEDURE — 84460 ALANINE AMINO (ALT) (SGPT): CPT

## 2018-10-17 ENCOUNTER — CLINICAL SUPPORT (OUTPATIENT)
Dept: INTERNAL MEDICINE | Facility: CLINIC | Age: 58
End: 2018-10-17
Payer: COMMERCIAL

## 2018-10-17 ENCOUNTER — TELEPHONE (OUTPATIENT)
Dept: INTERNAL MEDICINE | Facility: CLINIC | Age: 58
End: 2018-10-17

## 2018-10-17 VITALS — DIASTOLIC BLOOD PRESSURE: 92 MMHG | HEART RATE: 60 BPM | SYSTOLIC BLOOD PRESSURE: 152 MMHG

## 2018-10-17 PROCEDURE — 99999 PR PBB SHADOW E&M-EST. PATIENT-LVL II: CPT | Mod: PBBFAC,,,

## 2018-10-17 NOTE — PROGRESS NOTES
Pt here for bp check.  /94; P 60.  Had pt sit in quiet exam room.  Recheck BP was 152/92.  Notified Dr. Rodriguez who recommended pt increase losartan from 50 mg to 100 mg daily and f/u with him in clinic in 2 weeks.  Notified pt who verbalized understanding and stated he will take two 50 mg tablets of losartan for the next 2 weeks.

## 2018-10-17 NOTE — TELEPHONE ENCOUNTER
----- Message from Renee Prasad sent at 10/17/2018  9:22 AM CDT -----  Contact: self/[phone]  Returning call, please call back at [phone]. Thanks/ar

## 2018-10-17 NOTE — TELEPHONE ENCOUNTER
----- Message from Yadiel Meadows sent at 10/17/2018  8:22 AM CDT -----  Contact: David [phone]  Patient missed his appt yesterday and would like to see the nurse today, please contact pt if there is any availability.

## 2018-11-30 DIAGNOSIS — I10 ESSENTIAL HYPERTENSION: Primary | ICD-10-CM

## 2018-11-30 RX ORDER — LOSARTAN POTASSIUM 25 MG/1
TABLET ORAL
Qty: 90 TABLET | Refills: 0 | OUTPATIENT
Start: 2018-11-30

## 2018-11-30 NOTE — TELEPHONE ENCOUNTER
I think the wrong dose has been requested. I think it should be 100mg daily? Pt is also due for a bp check.

## 2019-03-04 ENCOUNTER — PATIENT OUTREACH (OUTPATIENT)
Dept: ADMINISTRATIVE | Facility: HOSPITAL | Age: 59
End: 2019-03-04

## 2019-03-04 NOTE — PROGRESS NOTES
I have attempted with out success to contact patient to schedule HTN appointment. Left voice mail

## 2019-07-05 DIAGNOSIS — I10 ESSENTIAL HYPERTENSION: ICD-10-CM

## 2019-07-05 RX ORDER — LOSARTAN POTASSIUM 100 MG/1
TABLET ORAL
Qty: 90 TABLET | Refills: 0 | Status: SHIPPED | OUTPATIENT
Start: 2019-07-05 | End: 2019-11-29 | Stop reason: SDUPTHER

## 2019-08-13 ENCOUNTER — TELEPHONE (OUTPATIENT)
Dept: INTERNAL MEDICINE | Facility: CLINIC | Age: 59
End: 2019-08-13

## 2019-08-15 ENCOUNTER — TELEPHONE (OUTPATIENT)
Dept: ADMINISTRATIVE | Facility: HOSPITAL | Age: 59
End: 2019-08-15

## 2019-09-20 ENCOUNTER — PATIENT OUTREACH (OUTPATIENT)
Dept: ADMINISTRATIVE | Facility: HOSPITAL | Age: 59
End: 2019-09-20

## 2019-09-20 NOTE — PROGRESS NOTES
I have attempted with out success to contact patient to schedule HTN appointment. Left voice mail

## 2019-11-29 DIAGNOSIS — I10 ESSENTIAL HYPERTENSION: ICD-10-CM

## 2019-11-29 RX ORDER — LOSARTAN POTASSIUM 100 MG/1
TABLET ORAL
Qty: 90 TABLET | Refills: 0 | Status: SHIPPED | OUTPATIENT
Start: 2019-11-29 | End: 2020-02-24 | Stop reason: SDUPTHER

## 2019-12-03 ENCOUNTER — PATIENT OUTREACH (OUTPATIENT)
Dept: ADMINISTRATIVE | Facility: HOSPITAL | Age: 59
End: 2019-12-03

## 2020-02-21 ENCOUNTER — TELEPHONE (OUTPATIENT)
Dept: INTERNAL MEDICINE | Facility: CLINIC | Age: 60
End: 2020-02-21

## 2020-02-21 ENCOUNTER — OFFICE VISIT (OUTPATIENT)
Dept: INTERNAL MEDICINE | Facility: CLINIC | Age: 60
End: 2020-02-21
Payer: COMMERCIAL

## 2020-02-21 VITALS
HEART RATE: 106 BPM | HEIGHT: 65 IN | TEMPERATURE: 100 F | DIASTOLIC BLOOD PRESSURE: 72 MMHG | WEIGHT: 171.94 LBS | SYSTOLIC BLOOD PRESSURE: 116 MMHG | OXYGEN SATURATION: 94 % | BODY MASS INDEX: 28.65 KG/M2

## 2020-02-21 DIAGNOSIS — R68.83 CHILLS: ICD-10-CM

## 2020-02-21 DIAGNOSIS — B35.3 TINEA PEDIS OF BOTH FEET: ICD-10-CM

## 2020-02-21 DIAGNOSIS — R05.9 COUGH: Primary | ICD-10-CM

## 2020-02-21 LAB
INFLUENZA A, MOLECULAR: NEGATIVE
INFLUENZA B, MOLECULAR: NEGATIVE
SPECIMEN SOURCE: NORMAL

## 2020-02-21 PROCEDURE — 99999 PR PBB SHADOW E&M-EST. PATIENT-LVL III: CPT | Mod: PBBFAC,,, | Performed by: INTERNAL MEDICINE

## 2020-02-21 PROCEDURE — 87502 INFLUENZA DNA AMP PROBE: CPT

## 2020-02-21 PROCEDURE — 99999 PR PBB SHADOW E&M-EST. PATIENT-LVL III: ICD-10-PCS | Mod: PBBFAC,,, | Performed by: INTERNAL MEDICINE

## 2020-02-21 PROCEDURE — 99214 OFFICE O/P EST MOD 30 MIN: CPT | Mod: S$PBB,,, | Performed by: INTERNAL MEDICINE

## 2020-02-21 PROCEDURE — 99214 PR OFFICE/OUTPT VISIT, EST, LEVL IV, 30-39 MIN: ICD-10-PCS | Mod: S$PBB,,, | Performed by: INTERNAL MEDICINE

## 2020-02-21 PROCEDURE — 99213 OFFICE O/P EST LOW 20 MIN: CPT | Mod: PBBFAC | Performed by: INTERNAL MEDICINE

## 2020-02-21 RX ORDER — BENZONATATE 200 MG/1
200 CAPSULE ORAL 3 TIMES DAILY PRN
Qty: 30 CAPSULE | Refills: 0 | Status: SHIPPED | OUTPATIENT
Start: 2020-02-21 | End: 2020-03-02

## 2020-02-21 RX ORDER — METHYLPREDNISOLONE 4 MG/1
TABLET ORAL
Qty: 1 PACKAGE | Refills: 0 | Status: SHIPPED | OUTPATIENT
Start: 2020-02-21 | End: 2020-08-21 | Stop reason: ALTCHOICE

## 2020-02-21 RX ORDER — PROMETHAZINE HYDROCHLORIDE AND DEXTROMETHORPHAN HYDROBROMIDE 6.25; 15 MG/5ML; MG/5ML
5 SYRUP ORAL NIGHTLY PRN
Qty: 180 ML | Refills: 0 | Status: SHIPPED | OUTPATIENT
Start: 2020-02-21 | End: 2020-08-21 | Stop reason: ALTCHOICE

## 2020-02-21 RX ORDER — OSELTAMIVIR PHOSPHATE 75 MG/1
75 CAPSULE ORAL 2 TIMES DAILY
Qty: 10 CAPSULE | Refills: 0 | Status: SHIPPED | OUTPATIENT
Start: 2020-02-21 | End: 2020-02-26

## 2020-02-21 RX ORDER — CICLOPIROX OLAMINE 7.7 MG/G
CREAM TOPICAL 2 TIMES DAILY
Qty: 90 G | Refills: 0 | Status: SHIPPED | OUTPATIENT
Start: 2020-02-21 | End: 2022-02-04

## 2020-02-21 NOTE — TELEPHONE ENCOUNTER
----- Message from Xi Moore sent at 2/21/2020  9:22 AM CST -----  Contact: pt  .Type:  Same Day Appointment Request    Caller is requesting a same day appointment.  Caller declined first available appointment listed below.    Name of Caller: pt  When is the first available appointment? 2/24  Symptoms : cough/annaul   Best Call Back Number: [phone] (home)   Additional Information:

## 2020-02-21 NOTE — PATIENT INSTRUCTIONS
flonase nasal spray 2 spays each nostril for nasal congestion, post nasal drip, runny nose    Delsym as needed for cough    Allegra or zyrtec for allergies, post nasal drip, runny nose, watery eyes.    cepacol throat lozenges for sore throat    mucinex for chest congestion.     Tylenol 500mg to  1000mg over counter every 8 hours as needed for fever above 100.3 and  Ibuprofen 600 to 800 mg over counter every 8 hours as needed  fever above 100.3            Influenza (Adult)    Influenza is also called the flu. It is a viral illness that affects the air passages of your lungs. It is different from the common cold. The flu can easily be passed from one to person to another. It may be spread through the air by coughing and sneezing. Or it can be spread by touching the sick person and then touching your own eyes, nose, or mouth.  The flu starts 1 to 3 days after you are exposed to the flu virus. It may last for 1 to 2 weeks but many people feel tired or fatigued for many weeks afterward. You usually dont need to take antibiotics unless you have a complication. This might be an ear or sinus infection or pneumonia.  Symptoms of the flu may be mild or severe. They can include extreme tiredness (wanting to stay in bed all day), chills, fevers, muscle aches, soreness with eye movement, headache, and a dry, hacking cough.  Home care  Follow these guidelines when caring for yourself at home:  · Avoid being around cigarette smoke, whether yours or other peoples.  · Acetaminophen or ibuprofen will help ease your fever, muscle aches, and headache. Dont give aspirin to anyone younger than 18 who has the flu. Aspirin can harm the liver.  · Nausea and loss of appetite are common with the flu. Eat light meals. Drink 6 to 8 glasses of liquids every day. Good choices are water, sport drinks, soft drinks without caffeine, juices, tea, and soup. Extra fluids will also help loosen secretions in your nose and lungs.  · Over-the-counter  cold medicines will not make the flu go away faster. But the medicines may help with coughing, sore throat, and congestion in your nose and sinuses. Dont use a decongestant if you have high blood pressure.  · Stay home until your fever has been gone for at least 24 hours without using medicine to reduce fever.  Follow-up care  Follow up with your healthcare provider, or as advised, if you are not getting better over the next week.  If you are age 65 or older, talk with your provider about getting a pneumococcal vaccine every 5 years. You should also get this vaccine if you have chronic asthma or COPD. All adults should get a flu vaccine every fall. Ask your provider about this.  When to seek medical advice  Call your healthcare provider right away if any of these occur:  · Cough with lots of colored mucus (sputum) or blood in your mucus  · Chest pain, shortness of breath, wheezing, or trouble breathing  · Severe headache, or face, neck, or ear pain  · New rash with fever  · Fever of 100.4°F (38°C) or higher, or as directed by your healthcare provider  · Confusion, behavior change, or seizure  · Severe weakness or dizziness  · You get a new fever or cough after getting better for a few days  Date Last Reviewed: 1/1/2017  © 0555-1729 [employer]. [address] [place] [address], [place] [address]. All rights reserved. This information is not intended as a substitute for professional medical care. Always follow your healthcare professional's instructions.        Influenza (Adult)    Influenza is also called the flu. It is a viral illness that affects the air passages of your lungs. It is different from the common cold. The flu can easily be passed from one to person to another. It may be spread through the air by coughing and sneezing. Or it can be spread by touching the sick person and then touching your own eyes, nose, or mouth.  The flu starts 1 to 3 days after you are exposed to the flu virus. It may last for  1 to 2 weeks but many people feel tired or fatigued for many weeks afterward. You usually dont need to take antibiotics unless you have a complication. This might be an ear or sinus infection or pneumonia.  Symptoms of the flu may be mild or severe. They can include extreme tiredness (wanting to stay in bed all day), chills, fevers, muscle aches, soreness with eye movement, headache, and a dry, hacking cough.  Home care  Follow these guidelines when caring for yourself at home:  · Avoid being around cigarette smoke, whether yours or other peoples.  · Acetaminophen or ibuprofen will help ease your fever, muscle aches, and headache. Dont give aspirin to anyone younger than 18 who has the flu. Aspirin can harm the liver.  · Nausea and loss of appetite are common with the flu. Eat light meals. Drink 6 to 8 glasses of liquids every day. Good choices are water, sport drinks, soft drinks without caffeine, juices, tea, and soup. Extra fluids will also help loosen secretions in your nose and lungs.  · Over-the-counter cold medicines will not make the flu go away faster. But the medicines may help with coughing, sore throat, and congestion in your nose and sinuses. Dont use a decongestant if you have high blood pressure.  · Stay home until your fever has been gone for at least 24 hours without using medicine to reduce fever.  Follow-up care  Follow up with your healthcare provider, or as advised, if you are not getting better over the next week.  If you are age 65 or older, talk with your provider about getting a pneumococcal vaccine every 5 years. You should also get this vaccine if you have chronic asthma or COPD. All adults should get a flu vaccine every fall. Ask your provider about this.  When to seek medical advice  Call your healthcare provider right away if any of these occur:  · Cough with lots of colored mucus (sputum) or blood in your mucus  · Chest pain, shortness of breath, wheezing, or trouble  breathing  · Severe headache, or face, neck, or ear pain  · New rash with fever  · Fever of 100.4°F (38°C) or higher, or as directed by your healthcare provider  · Confusion, behavior change, or seizure  · Severe weakness or dizziness  · You get a new fever or cough after getting better for a few days  Date Last Reviewed: 1/1/2017  © 9084-6989 [employer]. [address] [place] [address], [place] [address]. All rights reserved. This information is not intended as a substitute for professional medical care. Always follow your healthcare professional's instructions.

## 2020-02-23 NOTE — PROGRESS NOTES
"Subjective:      Patient ID: David Salter is a 59 y.o. male.    Chief Complaint: Emesis and Cough    Cough   This is a new problem. The current episode started yesterday. The problem has been rapidly worsening. The problem occurs every few minutes. The cough is non-productive. Associated symptoms include chills, headaches and postnasal drip. Pertinent negatives include no chest pain, ear congestion, ear pain, fever, hemoptysis, nasal congestion, rash, rhinorrhea, sore throat, shortness of breath, weight loss or wheezing. Nothing aggravates the symptoms. He has tried nothing for the symptoms. The treatment provided no relief. There is no history of asthma or COPD.      58 yo with   Patient Active Problem List   Diagnosis    Essential hypertension    Vitamin D deficiency    Gastroesophageal reflux disease    Other hyperlipidemia     Past Medical History:   Diagnosis Date    Hypertension      Here today c/o cough    Review of Systems   Constitutional: Positive for chills. Negative for fever and weight loss.   HENT: Positive for postnasal drip. Negative for ear pain, rhinorrhea, sinus pain, sore throat and trouble swallowing.    Respiratory: Positive for cough. Negative for hemoptysis, shortness of breath and wheezing.    Cardiovascular: Negative for chest pain.   Gastrointestinal: Negative for abdominal pain and blood in stool.   Genitourinary: Negative for dysuria and hematuria.   Musculoskeletal: Negative for neck pain and neck stiffness.   Skin: Negative for rash and wound.   Neurological: Positive for headaches. Negative for seizures and syncope.     Objective:   /72 (BP Location: Left arm, Patient Position: Sitting, BP Method: Large (Manual))   Pulse 106   Temp 99.7 °F (37.6 °C) (Oral)   Ht 5' 5" (1.651 m)   Wt 78 kg (171 lb 15.3 oz)   SpO2 (!) 94%   BMI 28.62 kg/m²     Physical Exam   Constitutional: He is oriented to person, place, and time. He appears well-developed and well-nourished. " No distress.   HENT:   Head: Normocephalic and atraumatic.   Right Ear: Tympanic membrane normal.   Left Ear: Tympanic membrane normal.   Nose: Mucosal edema and rhinorrhea present. Right sinus exhibits no maxillary sinus tenderness and no frontal sinus tenderness. Left sinus exhibits no maxillary sinus tenderness and no frontal sinus tenderness.   Mouth/Throat: Posterior oropharyngeal erythema present. No oropharyngeal exudate or posterior oropharyngeal edema.   Eyes: Pupils are equal, round, and reactive to light. Conjunctivae and EOM are normal.   Cardiovascular: Normal rate.   Pulmonary/Chest: Effort normal and breath sounds normal. He has no wheezes. He has no rales.   Musculoskeletal: He exhibits no edema.   Lymphadenopathy:     He has no cervical adenopathy.   Neurological: He is alert and oriented to person, place, and time.   Skin: Skin is warm and dry. No rash noted.   Psychiatric: He has a normal mood and affect. His behavior is normal.       Assessment:     1. Cough    2. Chills    3. Tinea pedis of both feet      Plan:   Cough  Suspect influenza  -     Influenza A & B by Molecular; Future; Expected date: 02/21/2020  -     methylPREDNISolone (MEDROL DOSEPACK) 4 mg tablet; use as directed  Dispense: 1 Package; Refill: 0  -     promethazine-dextromethorphan (PROMETHAZINE-DM) 6.25-15 mg/5 mL Syrp; Take 5 mLs by mouth nightly as needed (cough).  Dispense: 180 mL; Refill: 0  -     oseltamivir (TAMIFLU) 75 MG capsule; Take 1 capsule (75 mg total) by mouth 2 (two) times daily. for 5 days  Dispense: 10 capsule; Refill: 0  -     benzonatate (TESSALON) 200 MG capsule; Take 1 capsule (200 mg total) by mouth 3 (three) times daily as needed for Cough.  Dispense: 30 capsule; Refill: 0    Chills  -     oseltamivir (TAMIFLU) 75 MG capsule; Take 1 capsule (75 mg total) by mouth 2 (two) times daily. for 5 days  Dispense: 10 capsule; Refill: 0    Tinea pedis of both feet  Requested refill on loprox  -     ciclopirox  (LOPROX) 0.77 % Crea; Apply topically 2 (two) times daily.  Dispense: 90 g; Refill: 0            Problem List Items Addressed This Visit     None      Visit Diagnoses     Cough    -  Primary    Relevant Medications    methylPREDNISolone (MEDROL DOSEPACK) 4 mg tablet    promethazine-dextromethorphan (PROMETHAZINE-DM) 6.25-15 mg/5 mL Syrp    oseltamivir (TAMIFLU) 75 MG capsule    benzonatate (TESSALON) 200 MG capsule    Other Relevant Orders    Influenza A & B by Molecular (Completed)    Chills        Relevant Medications    oseltamivir (TAMIFLU) 75 MG capsule    Tinea pedis of both feet        Relevant Medications    ciclopirox (LOPROX) 0.77 % Crea          Follow up in about 3 days (around 2/24/2020), or if symptoms worsen or fail to improve.

## 2020-02-24 ENCOUNTER — OFFICE VISIT (OUTPATIENT)
Dept: INTERNAL MEDICINE | Facility: CLINIC | Age: 60
End: 2020-02-24
Payer: COMMERCIAL

## 2020-02-24 ENCOUNTER — LAB VISIT (OUTPATIENT)
Dept: LAB | Facility: HOSPITAL | Age: 60
End: 2020-02-24
Attending: INTERNAL MEDICINE
Payer: COMMERCIAL

## 2020-02-24 VITALS
TEMPERATURE: 98 F | WEIGHT: 170.63 LBS | BODY MASS INDEX: 28.4 KG/M2 | DIASTOLIC BLOOD PRESSURE: 88 MMHG | HEART RATE: 66 BPM | OXYGEN SATURATION: 98 % | SYSTOLIC BLOOD PRESSURE: 120 MMHG

## 2020-02-24 DIAGNOSIS — Z00.00 ROUTINE GENERAL MEDICAL EXAMINATION AT A HEALTH CARE FACILITY: Primary | ICD-10-CM

## 2020-02-24 DIAGNOSIS — Z00.00 ROUTINE GENERAL MEDICAL EXAMINATION AT A HEALTH CARE FACILITY: ICD-10-CM

## 2020-02-24 DIAGNOSIS — E55.9 VITAMIN D DEFICIENCY: ICD-10-CM

## 2020-02-24 DIAGNOSIS — I10 ESSENTIAL HYPERTENSION: ICD-10-CM

## 2020-02-24 DIAGNOSIS — E78.5 HYPERLIPIDEMIA, UNSPECIFIED HYPERLIPIDEMIA TYPE: ICD-10-CM

## 2020-02-24 DIAGNOSIS — E78.49 OTHER HYPERLIPIDEMIA: ICD-10-CM

## 2020-02-24 LAB
25(OH)D3+25(OH)D2 SERPL-MCNC: 10 NG/ML (ref 30–96)
ALBUMIN SERPL BCP-MCNC: 3.5 G/DL (ref 3.5–5.2)
ALP SERPL-CCNC: 94 U/L (ref 55–135)
ALT SERPL W/O P-5'-P-CCNC: 49 U/L (ref 10–44)
ANION GAP SERPL CALC-SCNC: 12 MMOL/L (ref 8–16)
AST SERPL-CCNC: 33 U/L (ref 10–40)
BASOPHILS # BLD AUTO: 0.01 K/UL (ref 0–0.2)
BASOPHILS NFR BLD: 0.2 % (ref 0–1.9)
BILIRUB SERPL-MCNC: 0.3 MG/DL (ref 0.1–1)
BUN SERPL-MCNC: 16 MG/DL (ref 6–20)
CALCIUM SERPL-MCNC: 8.9 MG/DL (ref 8.7–10.5)
CHLORIDE SERPL-SCNC: 103 MMOL/L (ref 95–110)
CHOLEST SERPL-MCNC: 231 MG/DL (ref 120–199)
CHOLEST/HDLC SERPL: 4.4 {RATIO} (ref 2–5)
CO2 SERPL-SCNC: 24 MMOL/L (ref 23–29)
CREAT SERPL-MCNC: 1 MG/DL (ref 0.5–1.4)
DIFFERENTIAL METHOD: ABNORMAL
EOSINOPHIL # BLD AUTO: 0 K/UL (ref 0–0.5)
EOSINOPHIL NFR BLD: 0 % (ref 0–8)
ERYTHROCYTE [DISTWIDTH] IN BLOOD BY AUTOMATED COUNT: 12.9 % (ref 11.5–14.5)
EST. GFR  (AFRICAN AMERICAN): >60 ML/MIN/1.73 M^2
EST. GFR  (NON AFRICAN AMERICAN): >60 ML/MIN/1.73 M^2
GLUCOSE SERPL-MCNC: 103 MG/DL (ref 70–110)
HCT VFR BLD AUTO: 52.4 % (ref 40–54)
HDLC SERPL-MCNC: 52 MG/DL (ref 40–75)
HDLC SERPL: 22.5 % (ref 20–50)
HGB BLD-MCNC: 16.3 G/DL (ref 14–18)
IMM GRANULOCYTES # BLD AUTO: 0.02 K/UL (ref 0–0.04)
IMM GRANULOCYTES NFR BLD AUTO: 0.3 % (ref 0–0.5)
LDLC SERPL CALC-MCNC: 156.6 MG/DL (ref 63–159)
LYMPHOCYTES # BLD AUTO: 1.4 K/UL (ref 1–4.8)
LYMPHOCYTES NFR BLD: 22.3 % (ref 18–48)
MCH RBC QN AUTO: 29.9 PG (ref 27–31)
MCHC RBC AUTO-ENTMCNC: 31.1 G/DL (ref 32–36)
MCV RBC AUTO: 96 FL (ref 82–98)
MONOCYTES # BLD AUTO: 0.4 K/UL (ref 0.3–1)
MONOCYTES NFR BLD: 5.7 % (ref 4–15)
NEUTROPHILS # BLD AUTO: 4.5 K/UL (ref 1.8–7.7)
NEUTROPHILS NFR BLD: 71.5 % (ref 38–73)
NONHDLC SERPL-MCNC: 179 MG/DL
NRBC BLD-RTO: 0 /100 WBC
PLATELET # BLD AUTO: 251 K/UL (ref 150–350)
PMV BLD AUTO: 12.1 FL (ref 9.2–12.9)
POTASSIUM SERPL-SCNC: 4.4 MMOL/L (ref 3.5–5.1)
PROT SERPL-MCNC: 8.2 G/DL (ref 6–8.4)
RBC # BLD AUTO: 5.45 M/UL (ref 4.6–6.2)
SODIUM SERPL-SCNC: 139 MMOL/L (ref 136–145)
TRIGL SERPL-MCNC: 112 MG/DL (ref 30–150)
TSH SERPL DL<=0.005 MIU/L-ACNC: 1.61 UIU/ML (ref 0.4–4)
WBC # BLD AUTO: 6.33 K/UL (ref 3.9–12.7)

## 2020-02-24 PROCEDURE — 99213 OFFICE O/P EST LOW 20 MIN: CPT | Mod: PBBFAC | Performed by: INTERNAL MEDICINE

## 2020-02-24 PROCEDURE — 80061 LIPID PANEL: CPT

## 2020-02-24 PROCEDURE — 84443 ASSAY THYROID STIM HORMONE: CPT

## 2020-02-24 PROCEDURE — 83036 HEMOGLOBIN GLYCOSYLATED A1C: CPT

## 2020-02-24 PROCEDURE — 80053 COMPREHEN METABOLIC PANEL: CPT

## 2020-02-24 PROCEDURE — 82306 VITAMIN D 25 HYDROXY: CPT

## 2020-02-24 PROCEDURE — 99999 PR PBB SHADOW E&M-EST. PATIENT-LVL III: CPT | Mod: PBBFAC,,, | Performed by: INTERNAL MEDICINE

## 2020-02-24 PROCEDURE — 85025 COMPLETE CBC W/AUTO DIFF WBC: CPT

## 2020-02-24 PROCEDURE — 99999 PR PBB SHADOW E&M-EST. PATIENT-LVL III: ICD-10-PCS | Mod: PBBFAC,,, | Performed by: INTERNAL MEDICINE

## 2020-02-24 PROCEDURE — 99396 PR PREVENTIVE VISIT,EST,40-64: ICD-10-PCS | Mod: S$PBB,,, | Performed by: INTERNAL MEDICINE

## 2020-02-24 PROCEDURE — 99396 PREV VISIT EST AGE 40-64: CPT | Mod: S$PBB,,, | Performed by: INTERNAL MEDICINE

## 2020-02-24 PROCEDURE — 36415 COLL VENOUS BLD VENIPUNCTURE: CPT

## 2020-02-24 PROCEDURE — 84153 ASSAY OF PSA TOTAL: CPT

## 2020-02-24 PROCEDURE — 86703 HIV-1/HIV-2 1 RESULT ANTBDY: CPT

## 2020-02-24 RX ORDER — ROSUVASTATIN CALCIUM 10 MG/1
10 TABLET, COATED ORAL DAILY
Qty: 90 TABLET | Refills: 2 | Status: SHIPPED | OUTPATIENT
Start: 2020-02-24 | End: 2021-03-29

## 2020-02-24 RX ORDER — LOSARTAN POTASSIUM 100 MG/1
TABLET ORAL
Qty: 90 TABLET | Refills: 2 | Status: SHIPPED | OUTPATIENT
Start: 2020-02-24 | End: 2020-11-25 | Stop reason: SDUPTHER

## 2020-02-24 NOTE — PROGRESS NOTES
Subjective:      Patient ID: David Salter is a 59 y.o. male.    Chief Complaint: Follow-up    HPI   60 yo with   Patient Active Problem List   Diagnosis    Essential hypertension    Vitamin D deficiency    Gastroesophageal reflux disease    Other hyperlipidemia     Past Medical History:   Diagnosis Date    Hypertension      Here today for annual prev exam.  Compliant with meds without significant side effects. Energy and appetite are good.     Feeling much better. Cough essentially resolved.     Review of Systems   Constitutional: Negative for chills and fever.   HENT: Negative for ear pain and sore throat.    Respiratory: Negative for cough.    Cardiovascular: Negative for chest pain.   Gastrointestinal: Negative for abdominal pain and blood in stool.   Genitourinary: Negative for dysuria and hematuria.   Neurological: Negative for seizures and syncope.     Objective:   /88 (BP Location: Right arm, Patient Position: Sitting, BP Method: Medium (Manual))   Pulse 66   Temp 97.6 °F (36.4 °C) (Oral)   Wt 77.4 kg (170 lb 10.2 oz)   SpO2 98%   BMI 28.40 kg/m²     Physical Exam   Constitutional: He is oriented to person, place, and time. He appears well-developed and well-nourished. No distress.   HENT:   Head: Normocephalic and atraumatic.   Mouth/Throat: Oropharynx is clear and moist.   Eyes: Pupils are equal, round, and reactive to light. EOM are normal.   Neck: Neck supple. No thyromegaly present.   Cardiovascular: Normal rate and regular rhythm.   Pulmonary/Chest: Breath sounds normal. He has no wheezes. He has no rales.   Abdominal: Soft. Bowel sounds are normal. There is no tenderness.   Musculoskeletal: He exhibits no edema.   Lymphadenopathy:     He has no cervical adenopathy.   Neurological: He is alert and oriented to person, place, and time.   Skin: Skin is warm and dry.   Psychiatric: He has a normal mood and affect. His behavior is normal.       Assessment:     1. Routine general  medical examination at a health care facility    2. Essential hypertension    3. Other hyperlipidemia    4. Vitamin D deficiency    5. Hyperlipidemia, unspecified hyperlipidemia type      Plan:   Routine general medical examination at a health care facility  Heart healthy diet and reg exercise  HM reviewed  -     Comprehensive metabolic panel; Future; Expected date: 02/24/2020  -     CBC auto differential; Future; Expected date: 02/24/2020  -     TSH; Future; Expected date: 02/24/2020  -     Lipid panel; Future; Expected date: 02/24/2020  -     PSA, Screening; Future; Expected date: 02/24/2020  -     Vitamin D; Future; Expected date: 02/24/2020  -     Hemoglobin A1c; Future; Expected date: 02/24/2020  -     HIV 1/2 Ag/Ab (4th Gen); Future; Expected date: 02/24/2020    Essential hypertension  controlled  -     losartan (COZAAR) 100 MG tablet; TAKE 1 TABLET(100 MG) BY MOUTH EVERY DAY  Dispense: 90 tablet; Refill: 2    Other hyperlipidemia    Vitamin D deficiency    Hyperlipidemia, unspecified hyperlipidemia type  Not compliant with crestor.  -     rosuvastatin (CRESTOR) 10 MG tablet; Take 1 tablet (10 mg total) by mouth once daily.  Dispense: 90 tablet; Refill: 2            Problem List Items Addressed This Visit        Cardiac/Vascular    Essential hypertension    Relevant Medications    losartan (COZAAR) 100 MG tablet    Other hyperlipidemia       Endocrine    Vitamin D deficiency      Other Visit Diagnoses     Routine general medical examination at a health care facility    -  Primary    Relevant Orders    Comprehensive metabolic panel    CBC auto differential    TSH    Lipid panel    PSA, Screening    Vitamin D    Hemoglobin A1c    HIV 1/2 Ag/Ab (4th Gen)    Hyperlipidemia, unspecified hyperlipidemia type        Relevant Medications    rosuvastatin (CRESTOR) 10 MG tablet          Follow up in about 6 months (around 8/24/2020), or if symptoms worsen or fail to improve.

## 2020-02-25 LAB
COMPLEXED PSA SERPL-MCNC: 0.39 NG/ML (ref 0–4)
ESTIMATED AVG GLUCOSE: 117 MG/DL (ref 68–131)
HBA1C MFR BLD HPLC: 5.7 % (ref 4–5.6)

## 2020-02-26 LAB — HIV 1+2 AB+HIV1 P24 AG SERPL QL IA: NEGATIVE

## 2020-05-01 ENCOUNTER — TELEPHONE (OUTPATIENT)
Dept: INTERNAL MEDICINE | Facility: CLINIC | Age: 60
End: 2020-05-01

## 2020-05-01 NOTE — TELEPHONE ENCOUNTER
----- Message from Susana Sagastume sent at 5/1/2020  1:22 PM CDT -----  Contact: Yareli with [employer]   Yareli called and stated she needs last clinical notes and medication list for this patient. She can be reached at [phone] fax [phone].    Thanks,  TF

## 2020-06-22 ENCOUNTER — NURSE TRIAGE (OUTPATIENT)
Dept: ADMINISTRATIVE | Facility: CLINIC | Age: 60
End: 2020-06-22

## 2020-06-22 ENCOUNTER — TELEPHONE (OUTPATIENT)
Dept: INTERNAL MEDICINE | Facility: CLINIC | Age: 60
End: 2020-06-22

## 2020-06-22 NOTE — TELEPHONE ENCOUNTER
----- Message from Sherrill Wong sent at 6/22/2020 10:13 AM CDT -----  Regarding: covid test  Contact: pt  Patient states she is waiting for COVID test to be order  so he can go get test spoke with On call this morning. Please call back at [phone].

## 2020-06-22 NOTE — TELEPHONE ENCOUNTER
Loss of smell and taste, chills, sore throat, muscle pain, weakness, no appetite. Temp was 102 2 days ago temp is now 97.7. Lips are dry. Care advice recommends pt call pcp. Pt is calling because he would like to be tested for covid 19. Please call and advise pt. [phone]     Reason for Disposition   [1] COVID-19 infection suspected by caller or triager AND [2] mild symptoms (cough, fever, or others) AND [3] no complications or SOB    Additional Information   Negative: SEVERE difficulty breathing (e.g., struggling for each breath, speaks in single words)   Negative: Difficult to awaken or acting confused (e.g., disoriented, slurred speech)   Negative: Bluish (or gray) lips or face now   Negative: Shock suspected (e.g., cold/pale/clammy skin, too weak to stand, low BP, rapid pulse)   Negative: Sounds like a life-threatening emergency to the triager   Negative: SEVERE or constant chest pain or pressure (Exception: mild central chest pain, present only when coughing)   Negative: MODERATE difficulty breathing (e.g., speaks in phrases, SOB even at rest, pulse 100-120)   Negative: Patient sounds very sick or weak to the triager   Negative: MILD difficulty breathing (e.g., minimal/no SOB at rest, SOB with walking, pulse <100)   Negative: Chest pain or pressure   Negative: Fever > 103 F (39.4 C)   Negative: [1] Fever > 101 F (38.3 C) AND [2] age > 60   Negative: [1] Fever > 100.0 F (37.8 C) AND [2] bedridden (e.g., nursing home patient, CVA, chronic illness, recovering from surgery)   Negative: HIGH RISK patient (e.g., age > 64 years, diabetes, heart or lung disease, weak immune system)   Negative: Fever present > 3 days (72 hours)   Negative: [1] Fever returns after gone for over 24 hours AND [2] symptoms worse or not improved   Negative: [1] Continuous (nonstop) coughing interferes with work or school AND [2] no improvement using cough treatment per protocol    Protocols used: CORONAVIRUS (COVID-19)  DIAGNOSED OR [id number]

## 2020-06-22 NOTE — TELEPHONE ENCOUNTER
Informed pt that once the order for testing is signed, I will contact him to schedule it. He verbalized understanding.

## 2020-07-14 ENCOUNTER — LAB VISIT (OUTPATIENT)
Dept: INTERNAL MEDICINE | Facility: CLINIC | Age: 60
End: 2020-07-14
Payer: OTHER GOVERNMENT

## 2020-07-14 ENCOUNTER — TELEPHONE (OUTPATIENT)
Dept: INTERNAL MEDICINE | Facility: CLINIC | Age: 60
End: 2020-07-14

## 2020-07-14 DIAGNOSIS — Z03.818 ENCOUNTER FOR OBSERVATION FOR SUSPECTED EXPOSURE TO OTHER BIOLOGICAL AGENTS RULED OUT: ICD-10-CM

## 2020-07-14 PROCEDURE — U0003 INFECTIOUS AGENT DETECTION BY NUCLEIC ACID (DNA OR RNA); SEVERE ACUTE RESPIRATORY SYNDROME CORONAVIRUS 2 (SARS-COV-2) (CORONAVIRUS DISEASE [COVID-19]), AMPLIFIED PROBE TECHNIQUE, MAKING USE OF HIGH THROUGHPUT TECHNOLOGIES AS DESCRIBED BY CMS-2020-01-R: HCPCS

## 2020-07-14 NOTE — TELEPHONE ENCOUNTER
Pt states he needs a retest for COVID. He tested positive a couple of weeks ago and needs a negative result for work.

## 2020-07-14 NOTE — TELEPHONE ENCOUNTER
----- Message from Sydnie Davis sent at 7/14/2020 10:57 AM CDT -----  Regarding: appt request  Contact: pt  Pt requesting an appt for a return to work with paperwork and covid test. Please call pt at [phone]  Pt states he is fine.

## 2020-07-16 ENCOUNTER — TELEPHONE (OUTPATIENT)
Dept: INTERNAL MEDICINE | Facility: CLINIC | Age: 60
End: 2020-07-16

## 2020-07-16 NOTE — TELEPHONE ENCOUNTER
----- Message from Jayde Diaz sent at 7/16/2020  9:32 AM CDT -----  Regarding: Results  Type:  Test Results    Who Called: PT  Name of Test (Lab/Mammo/Etc): LAB  Date of Test: 07/14  Ordering Provider: TYLER  Where the test was performed: [facility]  Would the patient rather a call back or a response via [facility]? CALL BACK  Best Call Back Number: [phone]  Additional Information:

## 2020-07-17 DIAGNOSIS — U07.1 COVID-19 VIRUS DETECTED: ICD-10-CM

## 2020-07-17 LAB — SARS-COV-2 RNA RESP QL NAA+PROBE: DETECTED

## 2020-07-20 ENCOUNTER — TELEPHONE (OUTPATIENT)
Dept: INTERNAL MEDICINE | Facility: CLINIC | Age: 60
End: 2020-07-20

## 2020-07-20 DIAGNOSIS — U07.1 COVID-19 VIRUS DETECTED: Primary | ICD-10-CM

## 2020-07-20 NOTE — TELEPHONE ENCOUNTER
There is no recommended time to retest as retesting is not recommended. Could try 15 to 20 days after diagnosis if employer insists on retesting.

## 2020-07-20 NOTE — TELEPHONE ENCOUNTER
----- Message from Tabby Steen sent at 7/20/2020  8:12 AM CDT -----  Regarding: COVID-19 test results  Type:  Test Results    Who Called: Pt   Name of Test (Lab/Mammo/Etc):  COVID-19 test   Date of Test:  07/14/2020  Ordering Provider:  Michael  Where the test was performed:  [facility]   Would the patient rather a call back or a response via [facility]? Call back   Best Call Back Number: [phone] (home)   Additional Information:  The patient is waiting on test results as soon as possible, please advise.

## 2020-07-20 NOTE — TELEPHONE ENCOUNTER
Pt was re-tested for covid on 7/14/2020. The pt test is still positive. His job requires negative result to go back to work. When can pt re-test

## 2020-07-23 ENCOUNTER — NURSE TRIAGE (OUTPATIENT)
Dept: ADMINISTRATIVE | Facility: CLINIC | Age: 60
End: 2020-07-23

## 2020-07-23 NOTE — TELEPHONE ENCOUNTER
Covid-19 symptom tracker call back, patient denies any symptoms. He responded to text in error, thought it was to confirm an appointment. No triage needed at this time.     Reason for Disposition   COVID-19, questions about    Additional Information   Negative: SEVERE difficulty breathing (e.g., struggling for each breath, speaks in single words)   Negative: Difficult to awaken or acting confused (e.g., disoriented, slurred speech)   Negative: Bluish (or gray) lips or face now   Negative: Shock suspected (e.g., cold/pale/clammy skin, too weak to stand, low BP, rapid pulse)   Negative: Sounds like a life-threatening emergency to the triager   Negative: [1] COVID-19 exposure AND [2] NO symptoms   Negative: COVID-19 and Breastfeeding, questions about   Negative: [1] Adult with possible COVID-19 symptoms AND [2] triager concerned about severity of symptoms or other causes   Negative: SEVERE or constant chest pain or pressure (Exception: mild central chest pain, present only when coughing)   Negative: MODERATE difficulty breathing (e.g., speaks in phrases, SOB even at rest, pulse 100-120)   Negative: MILD difficulty breathing (e.g., minimal/no SOB at rest, SOB with walking, pulse <100)   Negative: Chest pain   Negative: Patient sounds very sick or weak to the triager   Negative: Fever > 103 F (39.4 C)   Negative: [1] Fever > 101 F (38.3 C) AND [2] age > 60   Negative: [1] Fever > 100.0 F (37.8 C) AND [2] bedridden (e.g., nursing home patient, CVA, chronic illness, recovering from surgery)   Negative: HIGH RISK patient (e.g., age > 64 years, diabetes, heart or lung disease, weak immune system)   Negative: [1] COVID-19 infection suspected by caller or triager AND [2] mild symptoms (cough, fever, or others) AND [3] no complications or SOB   Negative: Fever present > 3 days (72 hours)   Negative: [1] Fever returns after gone for over 24 hours AND [2] symptoms worse or not improved   Negative: [1]  Continuous (nonstop) coughing interferes with work or school AND [2] no improvement using cough treatment per protocol   Negative: Cough present > 3 weeks   Negative: [1] COVID-19 diagnosed by positive lab test AND [2] mild symptoms (e.g., cough, fever, others) AND [3] no complications or SOB   Negative: [1] COVID-19 diagnosed by HCP (doctor, NP or PA) AND [2] mild symptoms (e.g., cough, fever, others) AND [3] no complications or SOB   Negative: COVID-19 Home Isolation, questions about   Negative: COVID-19 Testing, questions about   Negative: COVID-19 Prevention and Healthy Living, questions about    Protocols used: CORONAVIRUS (COVID-19) DIAGNOSED OR [id number]

## 2020-07-30 ENCOUNTER — LAB VISIT (OUTPATIENT)
Dept: INTERNAL MEDICINE | Facility: CLINIC | Age: 60
End: 2020-07-30
Payer: OTHER GOVERNMENT

## 2020-07-30 DIAGNOSIS — U07.1 COVID-19 VIRUS DETECTED: ICD-10-CM

## 2020-07-30 PROCEDURE — U0003 INFECTIOUS AGENT DETECTION BY NUCLEIC ACID (DNA OR RNA); SEVERE ACUTE RESPIRATORY SYNDROME CORONAVIRUS 2 (SARS-COV-2) (CORONAVIRUS DISEASE [COVID-19]), AMPLIFIED PROBE TECHNIQUE, MAKING USE OF HIGH THROUGHPUT TECHNOLOGIES AS DESCRIBED BY CMS-2020-01-R: HCPCS

## 2020-07-31 LAB — SARS-COV-2 RNA RESP QL NAA+PROBE: DETECTED

## 2020-08-03 ENCOUNTER — TELEPHONE (OUTPATIENT)
Dept: INTERNAL MEDICINE | Facility: CLINIC | Age: 60
End: 2020-08-03

## 2020-08-03 NOTE — TELEPHONE ENCOUNTER
----- Message from Karen Hemphill sent at 8/3/2020  9:00 AM CDT -----  Regarding: covid results  Contact: patient  Patient requesting and appt today and the results of his 2nd Covid test, please call him back at [phone]

## 2020-08-03 NOTE — TELEPHONE ENCOUNTER
I informed the pt that his test is positive. I advised the pt to talk to his job about accepting a letter stating that he can go back to work, since his is not symptomatic and had his first positive test in June. He verbalized understanding and will call back.

## 2020-08-10 ENCOUNTER — TELEPHONE (OUTPATIENT)
Dept: INTERNAL MEDICINE | Facility: CLINIC | Age: 60
End: 2020-08-10

## 2020-08-10 DIAGNOSIS — U07.1 COVID-19 VIRUS DETECTED: Primary | ICD-10-CM

## 2020-08-10 DIAGNOSIS — Z03.818 ENCOUNTER FOR OBSERVATION FOR SUSPECTED EXPOSURE TO OTHER BIOLOGICAL AGENTS RULED OUT: ICD-10-CM

## 2020-08-10 NOTE — TELEPHONE ENCOUNTER
----- Message from Kathy Dooley sent at 8/10/2020  2:12 PM CDT -----  Contact: patient/[phone]  Patient would like to speak with you concerning Covid test Results. Please call back at [phone].  Thanks LJ

## 2020-08-11 ENCOUNTER — TELEPHONE (OUTPATIENT)
Dept: INTERNAL MEDICINE | Facility: CLINIC | Age: 60
End: 2020-08-11

## 2020-08-11 NOTE — TELEPHONE ENCOUNTER
----- Message from Tabby Steen sent at 8/11/2020  9:38 AM CDT -----  Regarding: returning a missed call  Type:  Patient Returning Call    Who Called: Pt   Who Left Message for Patient: patito   Does the patient know what this is regarding?: no   Would the patient rather a call back or a response via [facility]? Call back   Best Call Back Number: [phone] ([place])   Additional Information: n/a

## 2020-08-13 ENCOUNTER — LAB VISIT (OUTPATIENT)
Dept: INTERNAL MEDICINE | Facility: CLINIC | Age: 60
End: 2020-08-13
Payer: OTHER GOVERNMENT

## 2020-08-13 DIAGNOSIS — Z03.818 ENCOUNTER FOR OBSERVATION FOR SUSPECTED EXPOSURE TO OTHER BIOLOGICAL AGENTS RULED OUT: ICD-10-CM

## 2020-08-13 DIAGNOSIS — U07.1 COVID-19 VIRUS DETECTED: ICD-10-CM

## 2020-08-13 PROCEDURE — U0003 INFECTIOUS AGENT DETECTION BY NUCLEIC ACID (DNA OR RNA); SEVERE ACUTE RESPIRATORY SYNDROME CORONAVIRUS 2 (SARS-COV-2) (CORONAVIRUS DISEASE [COVID-19]), AMPLIFIED PROBE TECHNIQUE, MAKING USE OF HIGH THROUGHPUT TECHNOLOGIES AS DESCRIBED BY CMS-2020-01-R: HCPCS

## 2020-08-14 LAB — SARS-COV-2 RNA RESP QL NAA+PROBE: NOT DETECTED

## 2020-08-17 ENCOUNTER — TELEPHONE (OUTPATIENT)
Dept: INTERNAL MEDICINE | Facility: CLINIC | Age: 60
End: 2020-08-17

## 2020-08-17 NOTE — TELEPHONE ENCOUNTER
----- Message from Hina Ellison sent at 8/17/2020  8:57 AM CDT -----  Contact: David Hernandez would like a call back at [phone], Regards to getting his covid 19 results.    Thanks  Td

## 2020-08-17 NOTE — TELEPHONE ENCOUNTER
Informed pt that his result is negative. He verbalized understanding and stated that he needs an appointment to have paperwork filled out. I scheduled him an appointment.

## 2020-08-17 NOTE — TELEPHONE ENCOUNTER
----- Message from Hina Ellison sent at 8/17/2020  8:57 AM CDT -----  Contact: David Hernandez would like a call back at [phone], Regards to getting his covid 19 results.    Thanks  Td

## 2020-08-21 ENCOUNTER — OFFICE VISIT (OUTPATIENT)
Dept: INTERNAL MEDICINE | Facility: CLINIC | Age: 60
End: 2020-08-21
Payer: OTHER GOVERNMENT

## 2020-08-21 VITALS
WEIGHT: 165.56 LBS | OXYGEN SATURATION: 97 % | DIASTOLIC BLOOD PRESSURE: 92 MMHG | SYSTOLIC BLOOD PRESSURE: 142 MMHG | TEMPERATURE: 97 F | BODY MASS INDEX: 27.55 KG/M2 | HEART RATE: 87 BPM

## 2020-08-21 DIAGNOSIS — U07.1 COVID-19 VIRUS DETECTED: Primary | ICD-10-CM

## 2020-08-21 DIAGNOSIS — E78.49 OTHER HYPERLIPIDEMIA: ICD-10-CM

## 2020-08-21 DIAGNOSIS — I10 ESSENTIAL HYPERTENSION: ICD-10-CM

## 2020-08-21 PROCEDURE — 99213 PR OFFICE/OUTPT VISIT, EST, LEVL III, 20-29 MIN: ICD-10-PCS | Mod: S$PBB,,, | Performed by: INTERNAL MEDICINE

## 2020-08-21 PROCEDURE — 99213 OFFICE O/P EST LOW 20 MIN: CPT | Mod: PBBFAC | Performed by: INTERNAL MEDICINE

## 2020-08-21 PROCEDURE — 99999 PR PBB SHADOW E&M-EST. PATIENT-LVL III: CPT | Mod: PBBFAC,,, | Performed by: INTERNAL MEDICINE

## 2020-08-21 PROCEDURE — 99213 OFFICE O/P EST LOW 20 MIN: CPT | Mod: S$PBB,,, | Performed by: INTERNAL MEDICINE

## 2020-08-21 PROCEDURE — 99999 PR PBB SHADOW E&M-EST. PATIENT-LVL III: ICD-10-PCS | Mod: PBBFAC,,, | Performed by: INTERNAL MEDICINE

## 2020-08-21 NOTE — PROGRESS NOTES
Subjective:      Patient ID: David Salter is a 59 y.o. male.    Chief Complaint: Paperwork    HPI     60 yo with   Patient Active Problem List   Diagnosis    Essential hypertension    Vitamin D deficiency    Gastroesophageal reflux disease    Other hyperlipidemia     Past Medical History:   Diagnosis Date    Hypertension        Here today for f/u on covid infection. Reports began symptoms June 19th sore throat, loss of taste and smell, headache, myalgias. fever.   [facility]aw [facility] hours 7/22. Pt reports coved test positive from that visit.   No cough, dyspnea, chest symptoms throughout illness.  Reports fever lasted about 4 days.   Regained taste and smell after 7 to 8 days.   Headache resolved after 6 or 7 days.  Myalgias resolved after 7 to 8 days.     [facility] chart review with positive covid test on 7/14 and 7/30    covid negative 8/13/20.     Requests work forms completed today.       Current Outpatient Medications:     ciclopirox (LOPROX) 0.77 % Crea, Apply topically 2 (two) times daily., Disp: 90 g, Rfl: 0    losartan (COZAAR) 100 MG tablet, TAKE 1 TABLET(100 MG) BY MOUTH EVERY DAY, Disp: 90 tablet, Rfl: 2    rosuvastatin (CRESTOR) 10 MG tablet, Take 1 tablet (10 mg total) by mouth once daily., Disp: 90 tablet, Rfl: 2    Review of Systems   Constitutional: Negative for chills and fever.   HENT: Negative for ear pain and sore throat.    Respiratory: Negative for cough.    Cardiovascular: Negative for chest pain.   Gastrointestinal: Negative for abdominal pain and blood in stool.   Genitourinary: Negative for dysuria and hematuria.   Neurological: Negative for seizures and syncope.     Objective:   BP (P) 138/78 (BP Location: Left arm, Patient Position: Sitting, BP Method: Medium (Manual))   Pulse 87   Temp 97.3 °F (36.3 °C) (Tympanic)   Wt 75.1 kg (165 lb 9.1 oz)   SpO2 97%   BMI 27.55 kg/m²     Physical Exam  Constitutional:       General: He is not in acute distress.     Appearance: He is  well-developed.   HENT:      Head: Normocephalic and atraumatic.   Eyes:      Pupils: Pupils are equal, round, and reactive to light.   Neck:      Musculoskeletal: Neck supple.      Thyroid: No thyromegaly.   Cardiovascular:      Rate and Rhythm: Normal rate and regular rhythm.   Pulmonary:      Breath sounds: Normal breath sounds. No wheezing or rales.   Abdominal:      General: Bowel sounds are normal.      Palpations: Abdomen is soft.      Tenderness: There is no abdominal tenderness.   Lymphadenopathy:      Cervical: No cervical adenopathy.   Skin:     General: Skin is warm and dry.   Neurological:      Mental Status: He is alert and oriented to person, place, and time.   Psychiatric:         Behavior: Behavior normal.         Assessment:     1. COVID-19 virus detected    2. Essential hypertension    3. Other hyperlipidemia      Plan:   COVID-19 virus detected  Symptoms resolved    Essential hypertension  Fair control  Cont current meds    Other hyperlipidemia    Forms completed for pt.           Problem List Items Addressed This Visit        Cardiac/Vascular    Essential hypertension    Other hyperlipidemia      Other Visit Diagnoses     COVID-19 virus detected    -  Primary          Follow up in about 3 weeks (around 9/11/2020), or if symptoms worsen or fail to improve.

## 2020-11-10 ENCOUNTER — TELEPHONE (OUTPATIENT)
Dept: INTERNAL MEDICINE | Facility: CLINIC | Age: 60
End: 2020-11-10

## 2020-11-10 NOTE — TELEPHONE ENCOUNTER
----- Message from Xi Moore sent at 11/10/2020  3:42 PM CST -----  Regarding: late appt  .Type:  Sooner Apoointment Request    Caller is requesting a sooner appointment.  Caller declined first available appointment listed below.  Caller will not accept being placed on the waitlist and is requesting a message be sent to doctor.  Name of Caller: pt  When is the first available appointment? 11/11 pt request an afternoon appt   Symptoms: physical   Would the patient rather a call back or a response via [email or web address]? Call back   Best Call Back Number: [phone] (home)       Additional Information:

## 2020-11-25 DIAGNOSIS — I10 ESSENTIAL HYPERTENSION: ICD-10-CM

## 2020-11-25 RX ORDER — LOSARTAN POTASSIUM 100 MG/1
TABLET ORAL
Qty: 90 TABLET | Refills: 2 | Status: SHIPPED | OUTPATIENT
Start: 2020-11-25 | End: 2021-09-29

## 2020-11-25 NOTE — TELEPHONE ENCOUNTER
----- Message from Kahty Dooley sent at 11/25/2020  9:26 AM CST -----  Contact: [phone]/SELF  Type:  RX Refill Request    Who Called: Patient  Refill or New Rx:Refill  RX Name and Strength:losartan (COZAAR) 100 MG tablet  How is the patient currently taking it? (ex. 1XDay):1 X day  Is this a 30 day or 90 day RX:90  Preferred Pharmacy with phone number:  [facility]   [phone]    Local or Mail Order:Local  Ordering Provider:Pardeep Rodriguez MD  Would the patient rather a call back or a response via [facility]? Call Back  Best Call Back Number:[phone]  Additional Information:         Luisito/JORDYN

## 2020-11-27 ENCOUNTER — TELEPHONE (OUTPATIENT)
Dept: INTERNAL MEDICINE | Facility: CLINIC | Age: 60
End: 2020-11-27

## 2020-11-27 NOTE — TELEPHONE ENCOUNTER
Attempted to contact and inform pt of approve refill and the need for a follow up. Unable to reach pt.    KVNG Nash

## 2021-03-29 ENCOUNTER — TELEPHONE (OUTPATIENT)
Dept: INTERNAL MEDICINE | Facility: CLINIC | Age: 61
End: 2021-03-29

## 2021-03-29 ENCOUNTER — OFFICE VISIT (OUTPATIENT)
Dept: INTERNAL MEDICINE | Facility: CLINIC | Age: 61
End: 2021-03-29
Payer: COMMERCIAL

## 2021-03-29 VITALS
HEART RATE: 76 BPM | BODY MASS INDEX: 30.82 KG/M2 | TEMPERATURE: 98 F | DIASTOLIC BLOOD PRESSURE: 84 MMHG | SYSTOLIC BLOOD PRESSURE: 128 MMHG | OXYGEN SATURATION: 96 % | WEIGHT: 185.19 LBS

## 2021-03-29 DIAGNOSIS — H00.015 HORDEOLUM EXTERNUM OF LEFT LOWER EYELID: Primary | ICD-10-CM

## 2021-03-29 DIAGNOSIS — I10 ESSENTIAL HYPERTENSION: ICD-10-CM

## 2021-03-29 PROCEDURE — 99214 OFFICE O/P EST MOD 30 MIN: CPT | Mod: S$GLB,,, | Performed by: FAMILY MEDICINE

## 2021-03-29 PROCEDURE — 3074F SYST BP LT 130 MM HG: CPT | Mod: CPTII,S$GLB,, | Performed by: FAMILY MEDICINE

## 2021-03-29 PROCEDURE — 99214 PR OFFICE/OUTPT VISIT, EST, LEVL IV, 30-39 MIN: ICD-10-PCS | Mod: S$GLB,,, | Performed by: FAMILY MEDICINE

## 2021-03-29 PROCEDURE — 3079F PR MOST RECENT DIASTOLIC BLOOD PRESSURE 80-89 MM HG: ICD-10-PCS | Mod: CPTII,S$GLB,, | Performed by: FAMILY MEDICINE

## 2021-03-29 PROCEDURE — 1125F AMNT PAIN NOTED PAIN PRSNT: CPT | Mod: S$GLB,,, | Performed by: FAMILY MEDICINE

## 2021-03-29 PROCEDURE — 3079F DIAST BP 80-89 MM HG: CPT | Mod: CPTII,S$GLB,, | Performed by: FAMILY MEDICINE

## 2021-03-29 PROCEDURE — 99999 PR PBB SHADOW E&M-EST. PATIENT-LVL III: ICD-10-PCS | Mod: PBBFAC,,, | Performed by: FAMILY MEDICINE

## 2021-03-29 PROCEDURE — 99999 PR PBB SHADOW E&M-EST. PATIENT-LVL III: CPT | Mod: PBBFAC,,, | Performed by: FAMILY MEDICINE

## 2021-03-29 PROCEDURE — 3008F BODY MASS INDEX DOCD: CPT | Mod: CPTII,S$GLB,, | Performed by: FAMILY MEDICINE

## 2021-03-29 PROCEDURE — 1125F PR PAIN SEVERITY QUANTIFIED, PAIN PRESENT: ICD-10-PCS | Mod: S$GLB,,, | Performed by: FAMILY MEDICINE

## 2021-03-29 PROCEDURE — 3008F PR BODY MASS INDEX (BMI) DOCUMENTED: ICD-10-PCS | Mod: CPTII,S$GLB,, | Performed by: FAMILY MEDICINE

## 2021-03-29 PROCEDURE — 3074F PR MOST RECENT SYSTOLIC BLOOD PRESSURE < 130 MM HG: ICD-10-PCS | Mod: CPTII,S$GLB,, | Performed by: FAMILY MEDICINE

## 2021-03-29 RX ORDER — ERYTHROMYCIN 5 MG/G
OINTMENT OPHTHALMIC EVERY 6 HOURS
Qty: 3.5 G | Refills: 0 | Status: SHIPPED | OUTPATIENT
Start: 2021-03-29 | End: 2022-02-04

## 2021-07-29 ENCOUNTER — OFFICE VISIT (OUTPATIENT)
Dept: INTERNAL MEDICINE | Facility: CLINIC | Age: 61
End: 2021-07-29
Payer: COMMERCIAL

## 2021-07-29 ENCOUNTER — HOSPITAL ENCOUNTER (OUTPATIENT)
Dept: RADIOLOGY | Facility: HOSPITAL | Age: 61
Discharge: HOME | End: 2021-07-29
Attending: PHYSICIAN ASSISTANT
Payer: COMMERCIAL

## 2021-07-29 ENCOUNTER — TELEPHONE (OUTPATIENT)
Dept: INTERNAL MEDICINE | Facility: CLINIC | Age: 61
End: 2021-07-29

## 2021-07-29 VITALS
OXYGEN SATURATION: 96 % | WEIGHT: 179.69 LBS | HEIGHT: 65 IN | TEMPERATURE: 98 F | DIASTOLIC BLOOD PRESSURE: 82 MMHG | SYSTOLIC BLOOD PRESSURE: 132 MMHG | HEART RATE: 67 BPM | BODY MASS INDEX: 29.94 KG/M2

## 2021-07-29 DIAGNOSIS — M54.2 NECK PAIN: ICD-10-CM

## 2021-07-29 DIAGNOSIS — M54.2 NECK PAIN: Primary | ICD-10-CM

## 2021-07-29 PROCEDURE — 1125F AMNT PAIN NOTED PAIN PRSNT: CPT | Mod: CPTII,S$GLB,, | Performed by: PHYSICIAN ASSISTANT

## 2021-07-29 PROCEDURE — 99214 PR OFFICE/OUTPT VISIT, EST, LEVL IV, 30-39 MIN: ICD-10-PCS | Mod: S$GLB,,, | Performed by: PHYSICIAN ASSISTANT

## 2021-07-29 PROCEDURE — 1125F PR PAIN SEVERITY QUANTIFIED, PAIN PRESENT: ICD-10-PCS | Mod: CPTII,S$GLB,, | Performed by: PHYSICIAN ASSISTANT

## 2021-07-29 PROCEDURE — 1160F PR REVIEW ALL MEDS BY PRESCRIBER/CLIN PHARMACIST DOCUMENTED: ICD-10-PCS | Mod: CPTII,S$GLB,, | Performed by: PHYSICIAN ASSISTANT

## 2021-07-29 PROCEDURE — 72050 XR CERVICAL SPINE COMPLETE 5 VIEW: ICD-10-PCS | Mod: 26,,, | Performed by: RADIOLOGY

## 2021-07-29 PROCEDURE — 3008F PR BODY MASS INDEX (BMI) DOCUMENTED: ICD-10-PCS | Mod: CPTII,S$GLB,, | Performed by: PHYSICIAN ASSISTANT

## 2021-07-29 PROCEDURE — 3075F PR MOST RECENT SYSTOLIC BLOOD PRESS GE 130-139MM HG: ICD-10-PCS | Mod: CPTII,S$GLB,, | Performed by: PHYSICIAN ASSISTANT

## 2021-07-29 PROCEDURE — 72050 X-RAY EXAM NECK SPINE 4/5VWS: CPT | Mod: 26,,, | Performed by: RADIOLOGY

## 2021-07-29 PROCEDURE — 3079F PR MOST RECENT DIASTOLIC BLOOD PRESSURE 80-89 MM HG: ICD-10-PCS | Mod: CPTII,S$GLB,, | Performed by: PHYSICIAN ASSISTANT

## 2021-07-29 PROCEDURE — 72050 X-RAY EXAM NECK SPINE 4/5VWS: CPT | Mod: TC

## 2021-07-29 PROCEDURE — 3079F DIAST BP 80-89 MM HG: CPT | Mod: CPTII,S$GLB,, | Performed by: PHYSICIAN ASSISTANT

## 2021-07-29 PROCEDURE — 3008F BODY MASS INDEX DOCD: CPT | Mod: CPTII,S$GLB,, | Performed by: PHYSICIAN ASSISTANT

## 2021-07-29 PROCEDURE — 1160F RVW MEDS BY RX/DR IN RCRD: CPT | Mod: CPTII,S$GLB,, | Performed by: PHYSICIAN ASSISTANT

## 2021-07-29 PROCEDURE — 99999 PR PBB SHADOW E&M-EST. PATIENT-LVL III: ICD-10-PCS | Mod: PBBFAC,,, | Performed by: PHYSICIAN ASSISTANT

## 2021-07-29 PROCEDURE — 99999 PR PBB SHADOW E&M-EST. PATIENT-LVL III: CPT | Mod: PBBFAC,,, | Performed by: PHYSICIAN ASSISTANT

## 2021-07-29 PROCEDURE — 3075F SYST BP GE 130 - 139MM HG: CPT | Mod: CPTII,S$GLB,, | Performed by: PHYSICIAN ASSISTANT

## 2021-07-29 PROCEDURE — 1159F MED LIST DOCD IN RCRD: CPT | Mod: CPTII,S$GLB,, | Performed by: PHYSICIAN ASSISTANT

## 2021-07-29 PROCEDURE — 1159F PR MEDICATION LIST DOCUMENTED IN MEDICAL RECORD: ICD-10-PCS | Mod: CPTII,S$GLB,, | Performed by: PHYSICIAN ASSISTANT

## 2021-07-29 PROCEDURE — 99214 OFFICE O/P EST MOD 30 MIN: CPT | Mod: S$GLB,,, | Performed by: PHYSICIAN ASSISTANT

## 2021-07-29 RX ORDER — IBUPROFEN 800 MG/1
800 TABLET ORAL 3 TIMES DAILY
Qty: 21 TABLET | Refills: 0 | Status: SHIPPED | OUTPATIENT
Start: 2021-07-29 | End: 2021-08-05

## 2021-07-29 RX ORDER — TIZANIDINE HYDROCHLORIDE 4 MG/1
4 CAPSULE, GELATIN COATED ORAL 3 TIMES DAILY PRN
Qty: 20 CAPSULE | Refills: 0 | Status: SHIPPED | OUTPATIENT
Start: 2021-07-29 | End: 2021-08-05

## 2021-08-31 ENCOUNTER — TELEPHONE (OUTPATIENT)
Dept: INTERNAL MEDICINE | Facility: CLINIC | Age: 61
End: 2021-08-31

## 2021-10-21 DIAGNOSIS — I10 ESSENTIAL HYPERTENSION: ICD-10-CM

## 2021-10-21 RX ORDER — LOSARTAN POTASSIUM 100 MG/1
TABLET ORAL
Qty: 90 TABLET | Refills: 0 | Status: SHIPPED | OUTPATIENT
Start: 2021-10-21 | End: 2022-01-19

## 2021-12-13 ENCOUNTER — PATIENT OUTREACH (OUTPATIENT)
Dept: ADMINISTRATIVE | Facility: HOSPITAL | Age: 61
End: 2021-12-13
Payer: COMMERCIAL

## 2022-01-18 DIAGNOSIS — I10 ESSENTIAL HYPERTENSION: ICD-10-CM

## 2022-01-18 NOTE — TELEPHONE ENCOUNTER
Care Due:                  Date            Visit Type   Department     Provider  --------------------------------------------------------------------------------                                             [facility]  Chantal Mainnelda  Last Visit: 03-      Arben Moura  Next Visit: None Scheduled  None         None Found                                                            Last  Test          Frequency    Reason                     Performed    Due Date  --------------------------------------------------------------------------------    Office Visit  12 months..  losartan.................  03- 03-    CMP.........  12 months..  losartan.................  Not Found    Overdue    Powered by [employer]. Reference number: [id number].   1/18/2022 3:58:51 AM CST

## 2022-01-19 RX ORDER — LOSARTAN POTASSIUM 100 MG/1
TABLET ORAL
Qty: 90 TABLET | Refills: 0 | Status: SHIPPED | OUTPATIENT
Start: 2022-01-19 | End: 2022-04-22

## 2022-02-02 DIAGNOSIS — I10 ESSENTIAL HYPERTENSION: ICD-10-CM

## 2022-02-04 ENCOUNTER — OFFICE VISIT (OUTPATIENT)
Dept: INTERNAL MEDICINE | Facility: CLINIC | Age: 62
End: 2022-02-04
Payer: COMMERCIAL

## 2022-02-04 ENCOUNTER — LAB VISIT (OUTPATIENT)
Dept: LAB | Facility: HOSPITAL | Age: 62
End: 2022-02-04
Attending: INTERNAL MEDICINE
Payer: COMMERCIAL

## 2022-02-04 VITALS
SYSTOLIC BLOOD PRESSURE: 128 MMHG | WEIGHT: 182.31 LBS | BODY MASS INDEX: 30.34 KG/M2 | HEART RATE: 72 BPM | DIASTOLIC BLOOD PRESSURE: 78 MMHG | OXYGEN SATURATION: 99 % | TEMPERATURE: 97 F

## 2022-02-04 DIAGNOSIS — M79.604 PAIN OF RIGHT LOWER EXTREMITY: ICD-10-CM

## 2022-02-04 DIAGNOSIS — M25.559 HIP PAIN: ICD-10-CM

## 2022-02-04 DIAGNOSIS — M25.569 KNEE PAIN, UNSPECIFIED CHRONICITY, UNSPECIFIED LATERALITY: ICD-10-CM

## 2022-02-04 DIAGNOSIS — I10 ESSENTIAL HYPERTENSION: ICD-10-CM

## 2022-02-04 DIAGNOSIS — Z00.00 ROUTINE GENERAL MEDICAL EXAMINATION AT A HEALTH CARE FACILITY: Primary | ICD-10-CM

## 2022-02-04 DIAGNOSIS — Z00.00 ROUTINE GENERAL MEDICAL EXAMINATION AT A HEALTH CARE FACILITY: ICD-10-CM

## 2022-02-04 LAB
ALBUMIN SERPL BCP-MCNC: 3.7 G/DL (ref 3.5–5.2)
ALP SERPL-CCNC: 108 U/L (ref 55–135)
ALT SERPL W/O P-5'-P-CCNC: 43 U/L (ref 10–44)
ANION GAP SERPL CALC-SCNC: 8 MMOL/L (ref 8–16)
AST SERPL-CCNC: 29 U/L (ref 10–40)
BASOPHILS # BLD AUTO: 0.04 K/UL (ref 0–0.2)
BASOPHILS NFR BLD: 0.5 % (ref 0–1.9)
BILIRUB SERPL-MCNC: 0.8 MG/DL (ref 0.1–1)
BUN SERPL-MCNC: 12 MG/DL (ref 6–20)
CALCIUM SERPL-MCNC: 9.3 MG/DL (ref 8.7–10.5)
CHLORIDE SERPL-SCNC: 102 MMOL/L (ref 95–110)
CHOLEST SERPL-MCNC: 270 MG/DL (ref 120–199)
CHOLEST/HDLC SERPL: 5.3 {RATIO} (ref 2–5)
CO2 SERPL-SCNC: 27 MMOL/L (ref 23–29)
COMPLEXED PSA SERPL-MCNC: 0.48 NG/ML (ref 0–4)
CREAT SERPL-MCNC: 1 MG/DL (ref 0.5–1.4)
DIFFERENTIAL METHOD: NORMAL
EOSINOPHIL # BLD AUTO: 0 K/UL (ref 0–0.5)
EOSINOPHIL NFR BLD: 0.5 % (ref 0–8)
ERYTHROCYTE [DISTWIDTH] IN BLOOD BY AUTOMATED COUNT: 12.8 % (ref 11.5–14.5)
EST. GFR  (AFRICAN AMERICAN): >60 ML/MIN/1.73 M^2
EST. GFR  (NON AFRICAN AMERICAN): >60 ML/MIN/1.73 M^2
ESTIMATED AVG GLUCOSE: 114 MG/DL (ref 68–131)
GLUCOSE SERPL-MCNC: 105 MG/DL (ref 70–110)
HBA1C MFR BLD: 5.6 % (ref 4–5.6)
HCT VFR BLD AUTO: 47 % (ref 40–54)
HDLC SERPL-MCNC: 51 MG/DL (ref 40–75)
HDLC SERPL: 18.9 % (ref 20–50)
HGB BLD-MCNC: 15.4 G/DL (ref 14–18)
IMM GRANULOCYTES # BLD AUTO: 0.02 K/UL (ref 0–0.04)
IMM GRANULOCYTES NFR BLD AUTO: 0.3 % (ref 0–0.5)
LDLC SERPL CALC-MCNC: 157 MG/DL (ref 63–159)
LYMPHOCYTES # BLD AUTO: 2.2 K/UL (ref 1–4.8)
LYMPHOCYTES NFR BLD: 28.8 % (ref 18–48)
MCH RBC QN AUTO: 30.8 PG (ref 27–31)
MCHC RBC AUTO-ENTMCNC: 32.8 G/DL (ref 32–36)
MCV RBC AUTO: 94 FL (ref 82–98)
MONOCYTES # BLD AUTO: 0.5 K/UL (ref 0.3–1)
MONOCYTES NFR BLD: 6.6 % (ref 4–15)
NEUTROPHILS # BLD AUTO: 4.8 K/UL (ref 1.8–7.7)
NEUTROPHILS NFR BLD: 63.3 % (ref 38–73)
NONHDLC SERPL-MCNC: 219 MG/DL
NRBC BLD-RTO: 0 /100 WBC
PLATELET # BLD AUTO: 197 K/UL (ref 150–450)
PMV BLD AUTO: 12.3 FL (ref 9.2–12.9)
POTASSIUM SERPL-SCNC: 4.3 MMOL/L (ref 3.5–5.1)
PROT SERPL-MCNC: 7.7 G/DL (ref 6–8.4)
RBC # BLD AUTO: 5 M/UL (ref 4.6–6.2)
SODIUM SERPL-SCNC: 137 MMOL/L (ref 136–145)
TRIGL SERPL-MCNC: 310 MG/DL (ref 30–150)
TSH SERPL DL<=0.005 MIU/L-ACNC: 1.44 UIU/ML (ref 0.4–4)
WBC # BLD AUTO: 7.53 K/UL (ref 3.9–12.7)

## 2022-02-04 PROCEDURE — 3078F DIAST BP <80 MM HG: CPT | Mod: CPTII,S$GLB,, | Performed by: INTERNAL MEDICINE

## 2022-02-04 PROCEDURE — 4010F PR ACE/ARB THEARPY RXD/TAKEN: ICD-10-PCS | Mod: CPTII,S$GLB,, | Performed by: INTERNAL MEDICINE

## 2022-02-04 PROCEDURE — 36415 COLL VENOUS BLD VENIPUNCTURE: CPT | Performed by: INTERNAL MEDICINE

## 2022-02-04 PROCEDURE — 3044F HG A1C LEVEL LT 7.0%: CPT | Mod: CPTII,S$GLB,, | Performed by: INTERNAL MEDICINE

## 2022-02-04 PROCEDURE — 1159F MED LIST DOCD IN RCRD: CPT | Mod: CPTII,S$GLB,, | Performed by: INTERNAL MEDICINE

## 2022-02-04 PROCEDURE — 3074F SYST BP LT 130 MM HG: CPT | Mod: CPTII,S$GLB,, | Performed by: INTERNAL MEDICINE

## 2022-02-04 PROCEDURE — 80061 LIPID PANEL: CPT | Performed by: INTERNAL MEDICINE

## 2022-02-04 PROCEDURE — 99396 PR PREVENTIVE VISIT,EST,40-64: ICD-10-PCS | Mod: 25,S$GLB,, | Performed by: INTERNAL MEDICINE

## 2022-02-04 PROCEDURE — 99999 PR PBB SHADOW E&M-EST. PATIENT-LVL III: CPT | Mod: PBBFAC,,, | Performed by: INTERNAL MEDICINE

## 2022-02-04 PROCEDURE — 1159F PR MEDICATION LIST DOCUMENTED IN MEDICAL RECORD: ICD-10-PCS | Mod: CPTII,S$GLB,, | Performed by: INTERNAL MEDICINE

## 2022-02-04 PROCEDURE — 3044F PR MOST RECENT HEMOGLOBIN A1C LEVEL <7.0%: ICD-10-PCS | Mod: CPTII,S$GLB,, | Performed by: INTERNAL MEDICINE

## 2022-02-04 PROCEDURE — 4010F ACE/ARB THERAPY RXD/TAKEN: CPT | Mod: CPTII,S$GLB,, | Performed by: INTERNAL MEDICINE

## 2022-02-04 PROCEDURE — 3008F PR BODY MASS INDEX (BMI) DOCUMENTED: ICD-10-PCS | Mod: CPTII,S$GLB,, | Performed by: INTERNAL MEDICINE

## 2022-02-04 PROCEDURE — 3008F BODY MASS INDEX DOCD: CPT | Mod: CPTII,S$GLB,, | Performed by: INTERNAL MEDICINE

## 2022-02-04 PROCEDURE — 3078F PR MOST RECENT DIASTOLIC BLOOD PRESSURE < 80 MM HG: ICD-10-PCS | Mod: CPTII,S$GLB,, | Performed by: INTERNAL MEDICINE

## 2022-02-04 PROCEDURE — 80053 COMPREHEN METABOLIC PANEL: CPT | Performed by: INTERNAL MEDICINE

## 2022-02-04 PROCEDURE — 99213 OFFICE O/P EST LOW 20 MIN: CPT | Mod: 25,S$GLB,, | Performed by: INTERNAL MEDICINE

## 2022-02-04 PROCEDURE — 99213 PR OFFICE/OUTPT VISIT, EST, LEVL III, 20-29 MIN: ICD-10-PCS | Mod: 25,S$GLB,, | Performed by: INTERNAL MEDICINE

## 2022-02-04 PROCEDURE — 83036 HEMOGLOBIN GLYCOSYLATED A1C: CPT | Performed by: INTERNAL MEDICINE

## 2022-02-04 PROCEDURE — 99999 PR PBB SHADOW E&M-EST. PATIENT-LVL III: ICD-10-PCS | Mod: PBBFAC,,, | Performed by: INTERNAL MEDICINE

## 2022-02-04 PROCEDURE — 85025 COMPLETE CBC W/AUTO DIFF WBC: CPT | Performed by: INTERNAL MEDICINE

## 2022-02-04 PROCEDURE — 99396 PREV VISIT EST AGE 40-64: CPT | Mod: 25,S$GLB,, | Performed by: INTERNAL MEDICINE

## 2022-02-04 PROCEDURE — 84443 ASSAY THYROID STIM HORMONE: CPT | Performed by: INTERNAL MEDICINE

## 2022-02-04 PROCEDURE — 3074F PR MOST RECENT SYSTOLIC BLOOD PRESSURE < 130 MM HG: ICD-10-PCS | Mod: CPTII,S$GLB,, | Performed by: INTERNAL MEDICINE

## 2022-02-04 PROCEDURE — 84153 ASSAY OF PSA TOTAL: CPT | Performed by: INTERNAL MEDICINE

## 2022-02-04 RX ORDER — METHYLPREDNISOLONE 4 MG/1
TABLET ORAL
Qty: 1 EACH | Refills: 0 | Status: SHIPPED | OUTPATIENT
Start: 2022-02-04 | End: 2022-02-14

## 2022-02-04 NOTE — PROGRESS NOTES
Subjective:      Patient ID: David Salter is a 61 y.o. male.    Chief Complaint: No chief complaint on file.    HPI     62 yo with   Patient Active Problem List   Diagnosis    Essential hypertension    Vitamin D deficiency    Gastroesophageal reflux disease    Other hyperlipidemia     Past Medical History:   Diagnosis Date    Hypertension      Here today for annual prev exam.  Compliant with meds without significant side effects. Energy and appetite are good.     Pt also c/o right hip and leg pain for 6 mo. Worse with walking.  Can radiate  Into right knee. No weakness. No trauma.     Past Surgical History:   Procedure Laterality Date    COLONOSCOPY N/A 10/2/2015    Procedure: COLONOSCOPY;  Surgeon: Byron Monroy III, MD;  Location: [facility];  Service: Endoscopy;  Laterality: N/A;     Social History     Socioeconomic History    Marital status:    Tobacco Use    Smoking status: Never Smoker    Smokeless tobacco: Never Used   Substance and Sexual Activity    Alcohol use: Yes     Alcohol/week: 5.0 standard drinks     Types: 5 Cans of beer per week    Drug use: No     family history includes Diabetes in his father.    Review of Systems   Constitutional: Negative for chills and fever.   HENT: Negative for ear pain and sore throat.    Respiratory: Negative for cough.    Cardiovascular: Negative for chest pain.   Gastrointestinal: Negative for abdominal pain and blood in stool.   Genitourinary: Negative for dysuria and hematuria.   Skin: Negative for rash and wound.   Neurological: Negative for seizures and syncope.     Objective:   /78 (BP Location: Left arm, Patient Position: Sitting, BP Method: Large (Manual))   Pulse 72   Temp 97.3 °F (36.3 °C) (Tympanic)   Wt 82.7 kg (182 lb 5.1 oz)   SpO2 99%   BMI 30.34 kg/m²     Physical Exam  Constitutional:       General: He is not in acute distress.     Appearance: He is well-developed and well-nourished.   HENT:      Head: Normocephalic  and atraumatic.      Mouth/Throat:      Mouth: Oropharynx is clear and moist.   Eyes:      Extraocular Movements: EOM normal.      Pupils: Pupils are equal, round, and reactive to light.   Neck:      Thyroid: No thyromegaly.   Cardiovascular:      Rate and Rhythm: Normal rate and regular rhythm.   Pulmonary:      Breath sounds: Normal breath sounds. No wheezing or rales.   Abdominal:      General: Bowel sounds are normal.      Palpations: Abdomen is soft.      Tenderness: There is no abdominal tenderness.   Musculoskeletal:         General: No swelling.      Cervical back: Neck supple.      Right hip: Tenderness present. No crepitus. Normal range of motion. Normal strength.      Right knee: No swelling or effusion. Normal range of motion. No tenderness.        Legs:    Lymphadenopathy:      Cervical: No cervical adenopathy.   Skin:     General: Skin is warm and dry.   Neurological:      Mental Status: He is alert and oriented to person, place, and time.   Psychiatric:         Mood and Affect: Mood and affect normal.         Behavior: Behavior normal.     mild tenderness over right greater trochanter.    Assessment:     1. Routine general medical examination at a health care facility    2. Essential hypertension    3. Pain of right lower extremity    4. Knee pain, unspecified chronicity, unspecified laterality    5. Hip pain      Plan:   Routine general medical examination at a health care facility  Heart healthy diet and reg exercise  HM reviewed  -     Comprehensive Metabolic Panel; Future; Expected date: 02/04/2022  -     CBC Auto Differential; Future; Expected date: 02/04/2022  -     TSH; Future; Expected date: 02/04/2022  -     Lipid Panel; Future; Expected date: 02/04/2022  -     Hemoglobin A1C; Future; Expected date: 02/04/2022  -     PSA, Screening; Future; Expected date: 02/04/2022    Essential hypertension  controlled  Pain of right lower extremity    Knee pain, unspecified chronicity, unspecified  laterality  -     methylPREDNISolone (MEDROL, PAUL,) 4 mg tablet; use as directed  Dispense: 1 each; Refill: 0    Hip pain  -     methylPREDNISolone (MEDROL, PAUL,) 4 mg tablet; use as directed  Dispense: 1 each; Refill: 0        Lab Frequency Next Occurrence   Comprehensive Metabolic Panel Once 02/02/2022       Problem List Items Addressed This Visit     Essential hypertension      Other Visit Diagnoses     Routine general medical examination at a health care facility    -  Primary    Relevant Orders    Comprehensive Metabolic Panel (Completed)    CBC Auto Differential (Completed)    TSH (Completed)    Lipid Panel (Completed)    Hemoglobin A1C (Completed)    PSA, Screening (Completed)    Pain of right lower extremity        Knee pain, unspecified chronicity, unspecified laterality        Relevant Medications    methylPREDNISolone (MEDROL, PAUL,) 4 mg tablet    Hip pain        Relevant Medications    methylPREDNISolone (MEDROL, PAUL,) 4 mg tablet          Follow up in about 6 months (around 8/4/2022), or if symptoms worsen or fail to improve.

## 2022-02-04 NOTE — PATIENT INSTRUCTIONS
Check with your pharmacy regarding new shingles vaccine.     Tylenol 500 to 1000 mg every 8 hours as needed for pain.

## 2022-02-14 ENCOUNTER — TELEPHONE (OUTPATIENT)
Dept: INTERNAL MEDICINE | Facility: CLINIC | Age: 62
End: 2022-02-14
Payer: COMMERCIAL

## 2022-02-14 NOTE — TELEPHONE ENCOUNTER
----- Message from Nathan Odell sent at 2/14/2022 12:57 PM CST -----  Contact: ZULEMA SOTO [[id number]]  .Type:  Test Results    Who Called:  ZULEMA SOTO [[id number]]  Name of Test (Lab/Mammo/Etc):   labs   Date of Test:   02/04/2022  Ordering Provider:   peng   Where the test was performed:   [facility]   Would the patient rather a call back or a response via [facility]?   Call    Best Call Back Number:   [phone] (home)    Additional Information:

## 2022-02-18 ENCOUNTER — TELEPHONE (OUTPATIENT)
Dept: INTERNAL MEDICINE | Facility: CLINIC | Age: 62
End: 2022-02-18
Payer: COMMERCIAL

## 2022-02-18 NOTE — TELEPHONE ENCOUNTER
----- Message from Leah Mullins sent at 2/18/2022 10:13 AM CST -----  Contact: Patient, [phone]  Patient is returning a phone call.  Who left a message for the patient: Radha  Does patient know what this is regarding:  Lab results  Would you like a call back, or a response through your [facility] portal?:   Call back  Comments:  Missed your call, please call him back. Thanks.

## 2022-02-21 ENCOUNTER — TELEPHONE (OUTPATIENT)
Dept: INTERNAL MEDICINE | Facility: CLINIC | Age: 62
End: 2022-02-21
Payer: COMMERCIAL

## 2022-02-21 NOTE — TELEPHONE ENCOUNTER
----- Message from Nicolle Staples sent at 2/21/2022  2:07 PM CST -----  Contact: David  Patient would like a call back at [phone]  in regards top his missed call and his results.    Thanks

## 2022-02-21 NOTE — TELEPHONE ENCOUNTER
----- Message from Amador Yessi sent at 2/21/2022 10:00 AM CST -----  .Type:  Patient Returning Call    Who Called:.David Salter    Who Left Message for Patient:  Does the patient know what this is regarding?:resultes  Would the patient rather a call back or a response via [facility]? Call back  Best Call Back Number:.[phone]    Additional Information: pt states he is returning missed call;  would like to have called returned around 12pm, when he takes his lunch break, unable to receive calls until then

## 2022-03-04 ENCOUNTER — OFFICE VISIT (OUTPATIENT)
Dept: INTERNAL MEDICINE | Facility: CLINIC | Age: 62
End: 2022-03-04
Payer: COMMERCIAL

## 2022-03-04 VITALS
OXYGEN SATURATION: 97 % | TEMPERATURE: 98 F | HEART RATE: 77 BPM | WEIGHT: 180.56 LBS | SYSTOLIC BLOOD PRESSURE: 128 MMHG | RESPIRATION RATE: 16 BRPM | HEIGHT: 65 IN | BODY MASS INDEX: 30.08 KG/M2 | DIASTOLIC BLOOD PRESSURE: 72 MMHG

## 2022-03-04 DIAGNOSIS — E78.49 OTHER HYPERLIPIDEMIA: Primary | ICD-10-CM

## 2022-03-04 PROCEDURE — 3078F PR MOST RECENT DIASTOLIC BLOOD PRESSURE < 80 MM HG: ICD-10-PCS | Mod: CPTII,S$GLB,, | Performed by: INTERNAL MEDICINE

## 2022-03-04 PROCEDURE — 99999 PR PBB SHADOW E&M-EST. PATIENT-LVL IV: ICD-10-PCS | Mod: PBBFAC,,, | Performed by: INTERNAL MEDICINE

## 2022-03-04 PROCEDURE — 1159F PR MEDICATION LIST DOCUMENTED IN MEDICAL RECORD: ICD-10-PCS | Mod: CPTII,S$GLB,, | Performed by: INTERNAL MEDICINE

## 2022-03-04 PROCEDURE — 99213 PR OFFICE/OUTPT VISIT, EST, LEVL III, 20-29 MIN: ICD-10-PCS | Mod: S$GLB,,, | Performed by: INTERNAL MEDICINE

## 2022-03-04 PROCEDURE — 3078F DIAST BP <80 MM HG: CPT | Mod: CPTII,S$GLB,, | Performed by: INTERNAL MEDICINE

## 2022-03-04 PROCEDURE — 3044F PR MOST RECENT HEMOGLOBIN A1C LEVEL <7.0%: ICD-10-PCS | Mod: CPTII,S$GLB,, | Performed by: INTERNAL MEDICINE

## 2022-03-04 PROCEDURE — 3008F BODY MASS INDEX DOCD: CPT | Mod: CPTII,S$GLB,, | Performed by: INTERNAL MEDICINE

## 2022-03-04 PROCEDURE — 4010F PR ACE/ARB THEARPY RXD/TAKEN: ICD-10-PCS | Mod: CPTII,S$GLB,, | Performed by: INTERNAL MEDICINE

## 2022-03-04 PROCEDURE — 3074F SYST BP LT 130 MM HG: CPT | Mod: CPTII,S$GLB,, | Performed by: INTERNAL MEDICINE

## 2022-03-04 PROCEDURE — 99213 OFFICE O/P EST LOW 20 MIN: CPT | Mod: S$GLB,,, | Performed by: INTERNAL MEDICINE

## 2022-03-04 PROCEDURE — 3074F PR MOST RECENT SYSTOLIC BLOOD PRESSURE < 130 MM HG: ICD-10-PCS | Mod: CPTII,S$GLB,, | Performed by: INTERNAL MEDICINE

## 2022-03-04 PROCEDURE — 4010F ACE/ARB THERAPY RXD/TAKEN: CPT | Mod: CPTII,S$GLB,, | Performed by: INTERNAL MEDICINE

## 2022-03-04 PROCEDURE — 3044F HG A1C LEVEL LT 7.0%: CPT | Mod: CPTII,S$GLB,, | Performed by: INTERNAL MEDICINE

## 2022-03-04 PROCEDURE — 99999 PR PBB SHADOW E&M-EST. PATIENT-LVL IV: CPT | Mod: PBBFAC,,, | Performed by: INTERNAL MEDICINE

## 2022-03-04 PROCEDURE — 3008F PR BODY MASS INDEX (BMI) DOCUMENTED: ICD-10-PCS | Mod: CPTII,S$GLB,, | Performed by: INTERNAL MEDICINE

## 2022-03-04 PROCEDURE — 1159F MED LIST DOCD IN RCRD: CPT | Mod: CPTII,S$GLB,, | Performed by: INTERNAL MEDICINE

## 2022-03-04 RX ORDER — ROSUVASTATIN CALCIUM 10 MG/1
10 TABLET, COATED ORAL DAILY
Qty: 90 TABLET | Refills: 3 | Status: SHIPPED | OUTPATIENT
Start: 2022-03-04 | End: 2027-03-04

## 2022-03-04 NOTE — PROGRESS NOTES
"Subjective:      Patient ID: David Salter is a 61 y.o. male.    Chief Complaint: Follow-up and Results    HPI     62 yo with   Patient Active Problem List   Diagnosis    Essential hypertension    Vitamin D deficiency    Gastroesophageal reflux disease    Other hyperlipidemia     Past Medical History:   Diagnosis Date    Hypertension      Here today for f/u on hyperlipidemia. Has h/o crestor without SEs. Unsure why stopped crestor.     Review of Systems   Constitutional: Negative for chills and fever.   HENT: Negative for ear pain and sore throat.    Respiratory: Negative for cough.    Cardiovascular: Negative for chest pain.   Gastrointestinal: Negative for abdominal pain and blood in stool.   Genitourinary: Negative for dysuria and hematuria.   Neurological: Negative for seizures and syncope.     Objective:   /72 (BP Location: Right arm, Patient Position: Sitting, BP Method: Large (Manual))   Pulse 77   Temp 98.2 °F (36.8 °C) (Oral)   Resp 16   Ht 5' 5" (1.651 m)   Wt 81.9 kg (180 lb 8.9 oz)   SpO2 97%   BMI 30.05 kg/m²     Physical Exam  Constitutional:       General: He is awake.      Appearance: Normal appearance.   HENT:      Head: Normocephalic and atraumatic.   Eyes:      Conjunctiva/sclera: Conjunctivae normal.   Pulmonary:      Effort: Pulmonary effort is normal.   Musculoskeletal:      Cervical back: Normal range of motion.   Neurological:      Mental Status: He is alert. Mental status is at baseline.   Psychiatric:         Mood and Affect: Mood normal.         Behavior: Behavior normal. Behavior is cooperative.         Thought Content: Thought content normal.         Judgment: Judgment normal.         Assessment:     1. Other hyperlipidemia      Plan:   Other hyperlipidemia  -     rosuvastatin (CRESTOR) 10 MG tablet; Take 1 tablet (10 mg total) by mouth once daily.  Dispense: 90 tablet; Refill: 3  -     Lipid Panel; Future; Expected date: 07/30/2022  -     ALT (SGPT); Future; " Expected date: 07/30/2022        Lab Frequency Next Occurrence   Comprehensive Metabolic Panel Once 02/02/2022       Problem List Items Addressed This Visit     Other hyperlipidemia - Primary    Relevant Medications    rosuvastatin (CRESTOR) 10 MG tablet    Other Relevant Orders    Lipid Panel    ALT (SGPT)          No follow-ups on file.  Needs labs on 7/31 at 8am and change 8/5 appt from 740 to 340.

## 2022-04-22 DIAGNOSIS — I10 ESSENTIAL HYPERTENSION: ICD-10-CM

## 2022-04-22 RX ORDER — LOSARTAN POTASSIUM 100 MG/1
TABLET ORAL
Qty: 90 TABLET | Refills: 3 | Status: SHIPPED | OUTPATIENT
Start: 2022-04-22 | End: 2023-05-03

## 2022-04-22 NOTE — TELEPHONE ENCOUNTER
No new care gaps identified.  Powered by [employer]. Reference number: [id number].   4/22/2022 4:02:24 AM CDT

## 2022-04-22 NOTE — TELEPHONE ENCOUNTER
Refill Authorization Note   David Salter  is requesting a refill authorization.  Brief Assessment and Rationale for Refill:  Approve     Medication Therapy Plan:       Medication Reconciliation Completed: No   Comments:     No Care Gaps recommended.     Note composed:10:59 AM 04/22/2022

## 2022-05-04 ENCOUNTER — LAB VISIT (OUTPATIENT)
Dept: LAB | Facility: HOSPITAL | Age: 62
End: 2022-05-04
Payer: COMMERCIAL

## 2022-05-04 ENCOUNTER — TELEPHONE (OUTPATIENT)
Dept: INTERNAL MEDICINE | Facility: CLINIC | Age: 62
End: 2022-05-04
Payer: COMMERCIAL

## 2022-05-04 ENCOUNTER — OFFICE VISIT (OUTPATIENT)
Dept: INTERNAL MEDICINE | Facility: CLINIC | Age: 62
End: 2022-05-04
Payer: COMMERCIAL

## 2022-05-04 VITALS
HEIGHT: 65 IN | BODY MASS INDEX: 29.8 KG/M2 | SYSTOLIC BLOOD PRESSURE: 154 MMHG | DIASTOLIC BLOOD PRESSURE: 82 MMHG | HEART RATE: 98 BPM | OXYGEN SATURATION: 93 % | WEIGHT: 178.88 LBS | TEMPERATURE: 99 F

## 2022-05-04 DIAGNOSIS — I10 ESSENTIAL HYPERTENSION: ICD-10-CM

## 2022-05-04 DIAGNOSIS — R09.82 POST-NASAL DRIP: Primary | ICD-10-CM

## 2022-05-04 DIAGNOSIS — M79.10 MYALGIA: ICD-10-CM

## 2022-05-04 DIAGNOSIS — R05.9 COUGH: ICD-10-CM

## 2022-05-04 DIAGNOSIS — M79.604 RIGHT LEG PAIN: ICD-10-CM

## 2022-05-04 LAB
ALBUMIN SERPL BCP-MCNC: 3.7 G/DL (ref 3.5–5.2)
ALP SERPL-CCNC: 88 U/L (ref 55–135)
ALT SERPL W/O P-5'-P-CCNC: 51 U/L (ref 10–44)
ANION GAP SERPL CALC-SCNC: 8 MMOL/L (ref 8–16)
AST SERPL-CCNC: 32 U/L (ref 10–40)
BILIRUB SERPL-MCNC: 1.1 MG/DL (ref 0.1–1)
BUN SERPL-MCNC: 12 MG/DL (ref 6–20)
CALCIUM SERPL-MCNC: 9.4 MG/DL (ref 8.7–10.5)
CHLORIDE SERPL-SCNC: 104 MMOL/L (ref 95–110)
CK SERPL-CCNC: 229 U/L (ref 20–200)
CO2 SERPL-SCNC: 26 MMOL/L (ref 23–29)
CREAT SERPL-MCNC: 0.9 MG/DL (ref 0.5–1.4)
EST. GFR  (AFRICAN AMERICAN): >60 ML/MIN/1.73 M^2
EST. GFR  (NON AFRICAN AMERICAN): >60 ML/MIN/1.73 M^2
GLUCOSE SERPL-MCNC: 116 MG/DL (ref 70–110)
MAGNESIUM SERPL-MCNC: 1.8 MG/DL (ref 1.6–2.6)
POTASSIUM SERPL-SCNC: 4.5 MMOL/L (ref 3.5–5.1)
PROT SERPL-MCNC: 7.9 G/DL (ref 6–8.4)
SODIUM SERPL-SCNC: 138 MMOL/L (ref 136–145)

## 2022-05-04 PROCEDURE — 99999 PR PBB SHADOW E&M-EST. PATIENT-LVL IV: CPT | Mod: PBBFAC,,,

## 2022-05-04 PROCEDURE — 4010F PR ACE/ARB THEARPY RXD/TAKEN: ICD-10-PCS | Mod: CPTII,S$GLB,,

## 2022-05-04 PROCEDURE — 3008F PR BODY MASS INDEX (BMI) DOCUMENTED: ICD-10-PCS | Mod: CPTII,S$GLB,,

## 2022-05-04 PROCEDURE — 3044F HG A1C LEVEL LT 7.0%: CPT | Mod: CPTII,S$GLB,,

## 2022-05-04 PROCEDURE — 36415 COLL VENOUS BLD VENIPUNCTURE: CPT

## 2022-05-04 PROCEDURE — 99214 PR OFFICE/OUTPT VISIT, EST, LEVL IV, 30-39 MIN: ICD-10-PCS | Mod: S$GLB,,,

## 2022-05-04 PROCEDURE — 82550 ASSAY OF CK (CPK): CPT

## 2022-05-04 PROCEDURE — 3044F PR MOST RECENT HEMOGLOBIN A1C LEVEL <7.0%: ICD-10-PCS | Mod: CPTII,S$GLB,,

## 2022-05-04 PROCEDURE — 3079F DIAST BP 80-89 MM HG: CPT | Mod: CPTII,S$GLB,,

## 2022-05-04 PROCEDURE — 1160F RVW MEDS BY RX/DR IN RCRD: CPT | Mod: CPTII,S$GLB,,

## 2022-05-04 PROCEDURE — 3008F BODY MASS INDEX DOCD: CPT | Mod: CPTII,S$GLB,,

## 2022-05-04 PROCEDURE — 4010F ACE/ARB THERAPY RXD/TAKEN: CPT | Mod: CPTII,S$GLB,,

## 2022-05-04 PROCEDURE — 99999 PR PBB SHADOW E&M-EST. PATIENT-LVL IV: ICD-10-PCS | Mod: PBBFAC,,,

## 2022-05-04 PROCEDURE — 3079F PR MOST RECENT DIASTOLIC BLOOD PRESSURE 80-89 MM HG: ICD-10-PCS | Mod: CPTII,S$GLB,,

## 2022-05-04 PROCEDURE — 99214 OFFICE O/P EST MOD 30 MIN: CPT | Mod: S$GLB,,,

## 2022-05-04 PROCEDURE — 1159F PR MEDICATION LIST DOCUMENTED IN MEDICAL RECORD: ICD-10-PCS | Mod: CPTII,S$GLB,,

## 2022-05-04 PROCEDURE — 1160F PR REVIEW ALL MEDS BY PRESCRIBER/CLIN PHARMACIST DOCUMENTED: ICD-10-PCS | Mod: CPTII,S$GLB,,

## 2022-05-04 PROCEDURE — 83735 ASSAY OF MAGNESIUM: CPT

## 2022-05-04 PROCEDURE — 3077F PR MOST RECENT SYSTOLIC BLOOD PRESSURE >= 140 MM HG: ICD-10-PCS | Mod: CPTII,S$GLB,,

## 2022-05-04 PROCEDURE — 1159F MED LIST DOCD IN RCRD: CPT | Mod: CPTII,S$GLB,,

## 2022-05-04 PROCEDURE — 3077F SYST BP >= 140 MM HG: CPT | Mod: CPTII,S$GLB,,

## 2022-05-04 PROCEDURE — 80053 COMPREHEN METABOLIC PANEL: CPT

## 2022-05-04 RX ORDER — METHYLPREDNISOLONE 4 MG/1
TABLET ORAL
Qty: 21 EACH | Refills: 0 | Status: SHIPPED | OUTPATIENT
Start: 2022-05-04 | End: 2022-05-25

## 2022-05-04 RX ORDER — MONTELUKAST SODIUM 10 MG/1
10 TABLET ORAL NIGHTLY
Qty: 30 TABLET | Refills: 0 | Status: SHIPPED | OUTPATIENT
Start: 2022-05-04 | End: 2022-05-04

## 2022-05-04 RX ORDER — FLUTICASONE PROPIONATE 50 MCG
1 SPRAY, SUSPENSION (ML) NASAL DAILY
Qty: 18.2 ML | Refills: 1 | Status: SHIPPED | OUTPATIENT
Start: 2022-05-04

## 2022-05-04 RX ORDER — PROMETHAZINE HYDROCHLORIDE AND DEXTROMETHORPHAN HYDROBROMIDE 6.25; 15 MG/5ML; MG/5ML
5 SYRUP ORAL EVERY 4 HOURS PRN
Qty: 118 ML | Refills: 0 | Status: SHIPPED | OUTPATIENT
Start: 2022-05-04 | End: 2022-05-14

## 2022-05-04 NOTE — PROGRESS NOTES
David Salter  05/04/2022  [id number]    Pardeep Rodriguez MD  Patient Care Team:  Pardeep Rodriguez MD as PCP - General (Internal Medicine)          Visit Type:an urgent visit for a new problem    Chief Complaint:  Chief Complaint   Patient presents with    Nausea    Emesis    Sore Throat    Cough     Yellow mucus       History of Present Illness:    Started feeling bad on yesterday   He is coughing up mucus    He had muscle pain before being placed on Crestor  His right leg hurts  Walks a lot at work  It only hurts at while he is laying down after being at work  Denies swelling in legs  Discussed with pcp regarding hip/leg pain and was given steroids  Helped for a few days and the pain came back    Feels like a tight/cramping sensation       History:  Past Medical History:   Diagnosis Date    Hypertension      Past Surgical History:   Procedure Laterality Date    COLONOSCOPY N/A 10/2/2015    Procedure: COLONOSCOPY;  Surgeon: Byron Monroy III, MD;  Location: [facility];  Service: Endoscopy;  Laterality: N/A;     Family History   Problem Relation Age of Onset    Diabetes Father      Social History     Socioeconomic History    Marital status:    Tobacco Use    Smoking status: Never Smoker    Smokeless tobacco: Never Used   Substance and Sexual Activity    Alcohol use: Yes     Alcohol/week: 5.0 standard drinks     Types: 5 Cans of beer per week    Drug use: No   Social History Narrative    , no smokers or pets in household; Originally from [place].     Patient Active Problem List   Diagnosis    Essential hypertension    Vitamin D deficiency    Gastroesophageal reflux disease    Other hyperlipidemia     Review of patient's allergies indicates:  No Known Allergies    The following were reviewed at this visit: active problem list, medication list, allergies, family history, social history, and health maintenance.    Medications:  Current Outpatient Medications on File Prior to Visit    Medication Sig Dispense Refill    losartan (COZAAR) 100 MG tablet TAKE 1 TABLET BY MOUTH EVERY DAY 90 tablet 3    rosuvastatin (CRESTOR) 10 MG tablet Take 1 tablet (10 mg total) by mouth once daily. 90 tablet 3     No current facility-administered medications on file prior to visit.       Medications have been reviewed and reconciled with patient at this visit.  Barriers to medications reviewed with patient.    Adverse reactions to current medications reviewed with patient..    Over the counter medications reviewed and reconciled with patient.    Exam:  Wt Readings from Last 3 Encounters:   05/04/22 81.1 kg (178 lb 14.5 oz)   03/04/22 81.9 kg (180 lb 8.9 oz)   02/04/22 82.7 kg (182 lb 5.1 oz)     Temp Readings from Last 3 Encounters:   05/04/22 98.6 °F (37 °C) (Temporal)   03/04/22 98.2 °F (36.8 °C) (Oral)   02/04/22 97.3 °F (36.3 °C) (Tympanic)     BP Readings from Last 3 Encounters:   05/04/22 (!) 154/82   03/04/22 128/72   02/04/22 128/78     Pulse Readings from Last 3 Encounters:   05/04/22 (!) 118   03/04/22 77   02/04/22 72     Body mass index is 29.77 kg/m².      Review of Systems   Constitutional: Negative.  Negative for chills and fever.   HENT: Positive for congestion. Negative for sinus pain and sore throat.    Eyes: Negative for blurred vision and double vision.   Respiratory: Positive for cough. Negative for sputum production, shortness of breath and wheezing.    Cardiovascular: Negative for chest pain, palpitations and leg swelling.   Gastrointestinal: Negative for abdominal pain, constipation, diarrhea, heartburn, nausea and vomiting.   Genitourinary: Negative.    Musculoskeletal: Positive for myalgias.   Skin: Negative.  Negative for rash.   Neurological: Negative.    Endo/Heme/Allergies: Negative.  Negative for polydipsia. Does not bruise/bleed easily.   Psychiatric/Behavioral: Negative for depression and substance abuse.     Physical Exam  Vitals and nursing note reviewed.   Constitutional:        General: He is not in acute distress.     Appearance: He is well-developed. He is not diaphoretic.   HENT:      Head: Normocephalic and atraumatic.      Right Ear: Tympanic membrane and external ear normal.      Left Ear: Tympanic membrane and external ear normal.      Nose: Congestion present.      Mouth/Throat:      Pharynx: Posterior oropharyngeal erythema present.   Eyes:      General:         Right eye: No discharge.         Left eye: No discharge.      Conjunctiva/sclera: Conjunctivae normal.      Pupils: Pupils are equal, round, and reactive to light.   Neck:      Thyroid: No thyromegaly.   Cardiovascular:      Rate and Rhythm: Normal rate and regular rhythm.      Pulses: Normal pulses.      Heart sounds: Normal heart sounds. No murmur heard.  Pulmonary:      Effort: Pulmonary effort is normal. No respiratory distress.      Breath sounds: Normal breath sounds. No wheezing.   Abdominal:      General: Bowel sounds are normal.   Musculoskeletal:         General: Tenderness present.      Cervical back: Normal range of motion and neck supple.      Right lower leg: Tenderness present. No swelling.      Comments: Negative homans sign in right leg    Lymphadenopathy:      Cervical: No cervical adenopathy.   Skin:     Capillary Refill: Capillary refill takes less than 2 seconds.   Neurological:      Mental Status: He is alert and oriented to person, place, and time.      Cranial Nerves: No cranial nerve deficit.   Psychiatric:         Behavior: Behavior normal.         Thought Content: Thought content normal.         Judgment: Judgment normal.         Laboratory Reviewed ({Yes)  Lab Results   Component Value Date    WBC 7.53 02/04/2022    HGB 15.4 02/04/2022    HCT 47.0 02/04/2022     02/04/2022    CHOL 270 (H) 02/04/2022    TRIG 310 (H) 02/04/2022    HDL 51 02/04/2022    ALT 43 02/04/2022    AST 29 02/04/2022     02/04/2022    K 4.3 02/04/2022     02/04/2022    CREATININE 1.0 02/04/2022    BUN 12  02/04/2022    CO2 27 02/04/2022    TSH 1.436 02/04/2022    PSA 0.48 02/04/2022    HGBA1C 5.6 02/04/2022       David was seen today for nausea, emesis, sore throat and cough.    Diagnoses and all orders for this visit:    Post-nasal drip  -     Discontinue: montelukast (SINGULAIR) 10 mg tablet; Take 1 tablet (10 mg total) by mouth every evening.  -     fluticasone propionate (FLONASE) 50 mcg/actuation nasal spray; 1 spray (50 mcg total) by Each Nostril route once daily.  -     methylPREDNISolone (MEDROL DOSEPACK) 4 mg tablet; use as directed    Cough  -     promethazine-dextromethorphan (PROMETHAZINE-DM) 6.25-15 mg/5 mL Syrp; Take 5 mLs by mouth every 4 (four) hours as needed (cough).  -     Discontinue: montelukast (SINGULAIR) 10 mg tablet; Take 1 tablet (10 mg total) by mouth every evening.  -     fluticasone propionate (FLONASE) 50 mcg/actuation nasal spray; 1 spray (50 mcg total) by Each Nostril route once daily.  -     methylPREDNISolone (MEDROL DOSEPACK) 4 mg tablet; use as directed    Essential hypertension    Myalgia  -     Magnesium; Future  -     CK; Future  -     Comprehensive Metabolic Panel; Future    Right leg pain    Will get labs to see if leg pain is caused by electrolyte deficiency     Bp not at goal  Will schedule f/u with PCP regarding BP             Care Plan/Goals: Reviewed    Goals    None         Follow up: No follow-ups on file.    After visit summary was printed and given to patient upon discharge today.  Patient goals and care plan are included in After Visit Summary.

## 2022-05-04 NOTE — TELEPHONE ENCOUNTER
----- Message from Katherin Alonso sent at 5/4/2022  7:18 AM CDT -----  Regarding: Sameday Appointment  Contact: Patient  .Type:  Same Day Appointment Request    Caller is requesting a same day appointment.  Caller declined first available appointment listed below.    Name of Caller:ZULEMA SOTO [[id number]]  When is the first available appointment? 05/05/22  Symptoms: nausea, vomiting, nasal congestion  Best Call Back Number: .[phone] (home)     Additional Information:   Dr Rodriguez or anyone at [facility]

## 2022-05-05 ENCOUNTER — PATIENT MESSAGE (OUTPATIENT)
Dept: INTERNAL MEDICINE | Facility: CLINIC | Age: 62
End: 2022-05-05
Payer: COMMERCIAL

## 2022-05-05 DIAGNOSIS — R74.8 ELEVATED CK: Primary | ICD-10-CM

## 2022-10-21 ENCOUNTER — PATIENT OUTREACH (OUTPATIENT)
Dept: ADMINISTRATIVE | Facility: HOSPITAL | Age: 62
End: 2022-10-21
Payer: COMMERCIAL

## 2022-10-21 ENCOUNTER — PATIENT MESSAGE (OUTPATIENT)
Dept: ADMINISTRATIVE | Facility: HOSPITAL | Age: 62
End: 2022-10-21
Payer: COMMERCIAL

## 2022-10-21 NOTE — PROGRESS NOTES
Working HTN report:     Called to discuss scheduling appointment and to get updated BP reading. No answer, LVM and sent mycahrt

## 2023-01-24 ENCOUNTER — TELEPHONE (OUTPATIENT)
Dept: ADMINISTRATIVE | Facility: HOSPITAL | Age: 63
End: 2023-01-24
Payer: COMMERCIAL

## 2023-01-24 ENCOUNTER — PATIENT OUTREACH (OUTPATIENT)
Dept: ADMINISTRATIVE | Facility: HOSPITAL | Age: 63
End: 2023-01-24
Payer: COMMERCIAL

## 2023-01-24 VITALS — DIASTOLIC BLOOD PRESSURE: 90 MMHG | SYSTOLIC BLOOD PRESSURE: 130 MMHG

## 2023-04-06 ENCOUNTER — PATIENT OUTREACH (OUTPATIENT)
Dept: ADMINISTRATIVE | Facility: HOSPITAL | Age: 63
End: 2023-04-06
Payer: COMMERCIAL

## 2023-04-06 NOTE — PROGRESS NOTES
Working HTN Report.    Pt due for annual exam.  Attempted to contact pt for an updated bp reading and to schedule appt.  No answer, voice mail not set up.

## 2023-05-03 DIAGNOSIS — R05.9 COUGH: ICD-10-CM

## 2023-05-03 DIAGNOSIS — I10 ESSENTIAL HYPERTENSION: ICD-10-CM

## 2023-05-03 DIAGNOSIS — R09.82 POST-NASAL DRIP: ICD-10-CM

## 2023-05-03 RX ORDER — LOSARTAN POTASSIUM 100 MG/1
TABLET ORAL
Qty: 90 TABLET | Refills: 0 | Status: SHIPPED | OUTPATIENT
Start: 2023-05-03 | End: 2023-07-31

## 2023-05-03 RX ORDER — MONTELUKAST SODIUM 10 MG/1
TABLET ORAL
Qty: 90 TABLET | Refills: 0 | Status: SHIPPED | OUTPATIENT
Start: 2023-05-03 | End: 2023-07-31

## 2023-05-03 NOTE — TELEPHONE ENCOUNTER
Care Due:                  Date            Visit Type   Department     Provider  --------------------------------------------------------------------------------                                EP -                              PRIMARY      [facility] INTER[facility]  Last Visit: 03-      CARE ([facility])   MEDICINE       Pardeep Rodriguez  Next Visit: None Scheduled  None         None Found                                                            Last  Test          Frequency    Reason                     Performed    Due Date  --------------------------------------------------------------------------------    Office Visit  12 months..  losartan, montelukast,     03- 02-                             rosuvastatin.............    CMP.........  12 months..  losartan, rosuvastatin...  05- 04-    Lipid Panel.  12 months..  rosuvastatin.............  02- 01-    [facility] Embedded Care Due Messages. Reference number: [id number].   5/03/2023 3:27:32 AM CDT

## 2023-05-03 NOTE — TELEPHONE ENCOUNTER
Refill Routing Note   Medication(s) are not appropriate for processing by [facility] for the following reason(s):      Required labs outdated  Required vitals abnormal    [facility] action(s):  Defer  Approve Appointment due  Labs due            Appointments  past 12m or future 3m with PCP    Date Provider   Last Visit   3/4/2022 Pardeep Rodriguez MD   Next Visit   Visit date not found Pardeep Rodriguez MD   ED visits in past 90 days: 0        Note composed:12:39 PM 05/03/2023

## 2023-07-28 ENCOUNTER — PATIENT MESSAGE (OUTPATIENT)
Dept: ADMINISTRATIVE | Facility: HOSPITAL | Age: 63
End: 2023-07-28
Payer: COMMERCIAL

## 2023-07-28 ENCOUNTER — PATIENT OUTREACH (OUTPATIENT)
Dept: ADMINISTRATIVE | Facility: HOSPITAL | Age: 63
End: 2023-07-28
Payer: COMMERCIAL

## 2023-07-28 NOTE — PROGRESS NOTES
Panel Continuity Report: Reached out to patient in regards to appointment with pcp. No answer, not able to LVM. Sent portal message as well.

## 2023-07-29 DIAGNOSIS — I10 ESSENTIAL HYPERTENSION: ICD-10-CM

## 2023-07-29 DIAGNOSIS — R05.9 COUGH: ICD-10-CM

## 2023-07-29 DIAGNOSIS — R09.82 POST-NASAL DRIP: ICD-10-CM

## 2023-07-29 NOTE — TELEPHONE ENCOUNTER
Care Due:                  Date            Visit Type   Department     Provider  --------------------------------------------------------------------------------                                EP -                              PRIMARY      [facility] INTERNAL  Last Visit: 03-      CARE ([facility])   MEDICINE       Pardeep Rodriguez  Next Visit: None Scheduled  None         None Found                                                            Last  Test          Frequency    Reason                     Performed    Due Date  --------------------------------------------------------------------------------    Office Visit  12 months..  losartan, montelukast,     03- 02-                             rosuvastatin.............    CMP.........  12 months..  losartan, rosuvastatin...  05- 04-    Lipid Panel.  12 months..  rosuvastatin.............  02- 01-    [facility] Embedded Care Due Messages. Reference number: [id number].   7/29/2023 3:45:42 AM CDT

## 2023-07-31 RX ORDER — LOSARTAN POTASSIUM 100 MG/1
TABLET ORAL
Qty: 90 TABLET | Refills: 0 | Status: SHIPPED | OUTPATIENT
Start: 2023-07-31

## 2023-07-31 RX ORDER — MONTELUKAST SODIUM 10 MG/1
TABLET ORAL
Qty: 90 TABLET | Refills: 0 | Status: SHIPPED | OUTPATIENT
Start: 2023-07-31

## 2025-07-11 ENCOUNTER — HOSPITAL ENCOUNTER (OUTPATIENT)
Dept: RADIOLOGY | Facility: HOSPITAL | Age: 65
Discharge: HOME | End: 2025-07-11
Attending: INTERNAL MEDICINE
Payer: COMMERCIAL

## 2025-07-11 ENCOUNTER — OFFICE VISIT (OUTPATIENT)
Dept: INTERNAL MEDICINE | Facility: CLINIC | Age: 65
End: 2025-07-11
Payer: COMMERCIAL

## 2025-07-11 VITALS
WEIGHT: 183 LBS | DIASTOLIC BLOOD PRESSURE: 72 MMHG | OXYGEN SATURATION: 99 % | BODY MASS INDEX: 30.49 KG/M2 | TEMPERATURE: 98 F | SYSTOLIC BLOOD PRESSURE: 130 MMHG | HEART RATE: 60 BPM | HEIGHT: 65 IN

## 2025-07-11 DIAGNOSIS — Z00.00 ANNUAL WELLNESS VISIT: Primary | ICD-10-CM

## 2025-07-11 DIAGNOSIS — I10 ESSENTIAL HYPERTENSION: Chronic | ICD-10-CM

## 2025-07-11 DIAGNOSIS — R10.9 LEFT FLANK PAIN: ICD-10-CM

## 2025-07-11 DIAGNOSIS — Z86.39 HISTORY OF ELEVATED GLUCOSE: Chronic | ICD-10-CM

## 2025-07-11 DIAGNOSIS — Z95.1 HX OF CABG: Chronic | ICD-10-CM

## 2025-07-11 DIAGNOSIS — E78.9 BORDERLINE HIGH CHOLESTEROL: Chronic | ICD-10-CM

## 2025-07-11 PROBLEM — E78.49 OTHER HYPERLIPIDEMIA: Status: RESOLVED | Noted: 2018-09-24 | Resolved: 2025-07-11

## 2025-07-11 PROCEDURE — 99999 PR PBB SHADOW E&M-EST. PATIENT-LVL V: CPT | Mod: PBBFAC,,, | Performed by: INTERNAL MEDICINE

## 2025-07-11 PROCEDURE — 74018 RADEX ABDOMEN 1 VIEW: CPT | Mod: TC,FY,PO

## 2025-07-11 PROCEDURE — 74018 RADEX ABDOMEN 1 VIEW: CPT | Mod: 26,,, | Performed by: RADIOLOGY

## 2025-07-11 RX ORDER — LIDOCAINE 50 MG/G
1 PATCH TOPICAL DAILY
Qty: 30 PATCH | Refills: 0 | Status: SHIPPED | OUTPATIENT
Start: 2025-07-11

## 2025-07-11 RX ORDER — ALBUTEROL SULFATE 90 UG/1
INHALANT RESPIRATORY (INHALATION)
COMMUNITY

## 2025-07-11 RX ORDER — VALSARTAN AND HYDROCHLOROTHIAZIDE 160; 12.5 MG/1; MG/1
1 TABLET, FILM COATED ORAL
COMMUNITY
End: 2025-07-11

## 2025-07-11 RX ORDER — NEBIVOLOL 10 MG/1
10 TABLET ORAL
COMMUNITY
Start: 2025-05-07

## 2025-07-11 RX ORDER — CLOPIDOGREL BISULFATE 75 MG/1
75 TABLET ORAL
COMMUNITY

## 2025-07-11 NOTE — PROGRESS NOTES
Chief Complaint  Chief Complaint   Patient presents with    [facility]        HPI  David Salter is a 64 y.o. male here today for the following: The primary encounter diagnosis was Annual wellness visit. Diagnoses of Left flank pain, Hx of CABG, Essential hypertension, Borderline high cholesterol, and History of elevated glucose were also pertinent to this visit.  Patient reported not experiencing significant side effects, except as noted in HPI if applicable.    Patient reported July 2025 had CABG done and they removed vein in left lower extremity.      Patient follows with:  Cardiology had CABG done and they removed vein and left lower extremity.    July 11, 2025.  Patient here with wife and patient reported here for establish care, annual, left side pain.  BP was at goal. MD counseled patient and answered questions.   MD obtained and reviewed records and discuss results with patient.  A1c was 5.6 November 2022.  MD counseled patient and answered questions.  LDL cholesterol was 170 and triglycerides were 269 November 2022.  MD counseled patient and answered questions.  Abdominal pain.  Patient reported abdominal pain left upper quadrant that began about Monday, 5 days ago and is stable.  Patient reported does not drink significantly but when he was in his 20s they would drink about 4-6 beers on a setting night.  Patient denied history of gallstones and reported he believes he had some kidney stones when he was much younger in the past on his own but this is different and the pain starts in the abdomen.  Patient reported tried rest and seeing if it would go away and is stable.  MD counseled patient and answered questions.  Patient agreed to blood work including lipase and KUB and ultrasound.    MD counseled patient and answered questions.     PHQ-2 (Reflex to PHQ-9) Interpretation:  Over the last two weeks how often have you been bothered by little interest or pleasure in doing things: 0  Over the last  two weeks how often have you been bothered by feeling down, depressed or hopeless: 0  PHQ-2 Total Score: 0       Review of patient's allergies indicates:  No Known Allergies    MEDS:  Current Outpatient Medications   Medication Instructions    albuterol (VENTOLIN HFA) 90 mcg/actuation inhaler INHALE 2 PUFFS BY MOUTH INTO THE LUNGS TWICE DAILY AS DIRECTED    clopidogreL (PLAVIX) 75 mg    fluticasone propionate (FLONASE) 50 mcg, Each Nostril, Daily    LIDOcaine (LIDODERM) 5 % 1 patch, Transdermal, Daily, Remove & Discard patch within 12 hours or as directed by MD.    losartan (COZAAR) 100 MG tablet TAKE 1 TABLET BY MOUTH EVERY DAY    montelukast (SINGULAIR) 10 mg tablet TAKE 1 TABLET(10 MG) BY MOUTH EVERY EVENING    nebivoloL (BYSTOLIC) 10 mg    rosuvastatin (CRESTOR) 10 mg, Oral, Daily        Past Medical History:   Diagnosis Date    CHF (congestive heart failure)     Hypertension        Past Surgical History:   Procedure Laterality Date    CARDIAC SURGERY  11/02/2023    COLONOSCOPY N/A 10/02/2015    Procedure: COLONOSCOPY;  Surgeon: Byron Monroy III, MD;  Location: [facility];  Service: Endoscopy;  Laterality: N/A;       Family History   Problem Relation Name Age of Onset    Diabetes Father         Social History     Substance and Sexual Activity   Drug Use No       Review of Systems   Review of Systems   Constitutional:         As per HPI, otherwise negative.    HENT:          As per HPI, otherwise negative.    Eyes:         As per HPI, otherwise negative.    Respiratory:          As per HPI, otherwise negative.    Cardiovascular:         As per HPI, otherwise negative.    Gastrointestinal:         As per HPI, otherwise negative.    Genitourinary:         As per HPI, otherwise negative.    Musculoskeletal:         As per HPI, otherwise negative.    Skin:         As per HPI, otherwise negative.    Neurological:         As per HPI, otherwise negative.    Endo/Heme/Allergies:         As per HPI, otherwise negative.  "   Psychiatric/Behavioral:          As per HPI, otherwise negative.        Objective   Vitals:    07/11/25 1521   BP: 130/72   BP Location: Right arm   Patient Position: Sitting   Pulse: 60   Temp: 97.8 °F (36.6 °C)   SpO2: 99%   Weight: 83 kg (182 lb 15.7 oz)   Height: 5' 5" (1.651 m)       Body mass index is 30.45 kg/m².    Physical Exam  HENT:      Head: Normocephalic.   Eyes:      Extraocular Movements: Extraocular movements intact.   Cardiovascular:      Rate and Rhythm: Regular rhythm.   Pulmonary:      Effort: No respiratory distress.   Abdominal:      General: There is no distension.      Palpations: Abdomen is soft. There is no mass.      Tenderness: There is abdominal tenderness. There is no right CVA tenderness, left CVA tenderness, guarding or rebound.      Hernia: No hernia is present.      Comments: Positive mild tenderness left upper quadrant of abdomen, no erythema, bruising.   Musculoskeletal:      Cervical back: Neck supple.   Skin:     General: Skin is warm.   Neurological:      Mental Status: He is alert. Mental status is at baseline.         Recent labs:   No results found for this or any previous visit (from the past 24 hours).    Assessment / Plan  David was seen today for establish care.    Diagnoses and all orders for this visit:    Annual wellness visit  -     CBC Auto Differential; Future  -     Comprehensive Metabolic Panel; Future  -     Lipid Panel; Future  -     TSH; Future  -     Hemoglobin A1C; Future  -     T4, Free; Future  -     Lipase; Future  -     Amylase; Future  -     LIDOcaine (LIDODERM) 5 %; Place 1 patch onto the skin once daily. Remove & Discard patch within 12 hours or as directed by MD.    Left flank pain  Comments:  New problem, July 2025 acute, stable, x-ray, ultrasound abdomen and blood work.  Orders:  -     Lipase; Future  -     Amylase; Future  -     LIDOcaine (LIDODERM) 5 %; Place 1 patch onto the skin once daily. Remove & Discard patch within 12 hours or as " directed by MD.  -     X-Ray Abdomen AP 1 View; Future  -     US Abdomen Complete; Future    Hx of CABG  Comments:  Removed vein in left lower extremity.  Chronic, stable, cardiology.    Essential hypertension  Comments:  Chronic, stable, losartan.    Borderline high cholesterol  Comments:  Chronic, stable, rosuvastatin.    History of elevated glucose  Comments:  Chronic, stable, eat healthy.  Orders:  -     Hemoglobin A1C; Future         Follow up in about 5 days (around 7/16/2025), or if symptoms worsen or fail to improve.

## 2025-07-14 ENCOUNTER — HOSPITAL ENCOUNTER (OUTPATIENT)
Dept: RADIOLOGY | Facility: HOSPITAL | Age: 65
Discharge: HOME | End: 2025-07-14
Attending: INTERNAL MEDICINE
Payer: COMMERCIAL

## 2025-07-14 DIAGNOSIS — R10.9 LEFT FLANK PAIN: ICD-10-CM

## 2025-07-14 PROCEDURE — 76700 US EXAM ABDOM COMPLETE: CPT | Mod: TC

## 2025-07-14 PROCEDURE — 76700 US EXAM ABDOM COMPLETE: CPT | Mod: 26,,, | Performed by: RADIOLOGY

## 2025-07-16 ENCOUNTER — OFFICE VISIT (OUTPATIENT)
Dept: INTERNAL MEDICINE | Facility: CLINIC | Age: 65
End: 2025-07-16
Payer: COMMERCIAL

## 2025-07-16 VITALS
HEART RATE: 72 BPM | WEIGHT: 186.06 LBS | TEMPERATURE: 98 F | DIASTOLIC BLOOD PRESSURE: 72 MMHG | SYSTOLIC BLOOD PRESSURE: 132 MMHG | OXYGEN SATURATION: 96 % | BODY MASS INDEX: 30.96 KG/M2

## 2025-07-16 DIAGNOSIS — K59.00 CONSTIPATION, UNSPECIFIED CONSTIPATION TYPE: Chronic | ICD-10-CM

## 2025-07-16 DIAGNOSIS — I10 ESSENTIAL HYPERTENSION: Chronic | ICD-10-CM

## 2025-07-16 DIAGNOSIS — Z95.1 HX OF CABG: Chronic | ICD-10-CM

## 2025-07-16 DIAGNOSIS — R10.84 GENERALIZED ABDOMINAL PAIN: Primary | ICD-10-CM

## 2025-07-16 DIAGNOSIS — Z12.5 ENCOUNTER FOR PROSTATE CANCER SCREENING: ICD-10-CM

## 2025-07-16 DIAGNOSIS — E78.9 BORDERLINE HIGH CHOLESTEROL: Chronic | ICD-10-CM

## 2025-07-16 DIAGNOSIS — Z00.00 ANNUAL WELLNESS VISIT: ICD-10-CM

## 2025-07-16 DIAGNOSIS — Z86.39 HISTORY OF ELEVATED GLUCOSE: ICD-10-CM

## 2025-07-16 DIAGNOSIS — Z12.11 ENCOUNTER FOR SCREENING FOR MALIGNANT NEOPLASM OF COLON: ICD-10-CM

## 2025-07-16 PROBLEM — J30.2 SEASONAL ALLERGIES: Status: ACTIVE | Noted: 2023-03-24

## 2025-07-16 PROBLEM — I25.700 CORONARY ARTERY DISEASE INVOLVING CORONARY BYPASS GRAFT OF NATIVE HEART WITH UNSTABLE ANGINA PECTORIS: Status: ACTIVE | Noted: 2022-11-01

## 2025-07-16 PROBLEM — I25.10 CORONARY ATHEROSCLEROSIS: Status: ACTIVE | Noted: 2023-03-24

## 2025-07-16 PROCEDURE — 1159F MED LIST DOCD IN RCRD: CPT | Mod: CPTII,S$GLB,, | Performed by: INTERNAL MEDICINE

## 2025-07-16 PROCEDURE — 4010F ACE/ARB THERAPY RXD/TAKEN: CPT | Mod: CPTII,S$GLB,, | Performed by: INTERNAL MEDICINE

## 2025-07-16 PROCEDURE — 99999 PR PBB SHADOW E&M-EST. PATIENT-LVL V: CPT | Mod: PBBFAC,,, | Performed by: INTERNAL MEDICINE

## 2025-07-16 PROCEDURE — 3078F DIAST BP <80 MM HG: CPT | Mod: CPTII,S$GLB,, | Performed by: INTERNAL MEDICINE

## 2025-07-16 PROCEDURE — G2211 COMPLEX E/M VISIT ADD ON: HCPCS | Mod: S$GLB,,, | Performed by: INTERNAL MEDICINE

## 2025-07-16 PROCEDURE — 3008F BODY MASS INDEX DOCD: CPT | Mod: CPTII,S$GLB,, | Performed by: INTERNAL MEDICINE

## 2025-07-16 PROCEDURE — 3044F HG A1C LEVEL LT 7.0%: CPT | Mod: CPTII,S$GLB,, | Performed by: INTERNAL MEDICINE

## 2025-07-16 PROCEDURE — 99214 OFFICE O/P EST MOD 30 MIN: CPT | Mod: S$GLB,,, | Performed by: INTERNAL MEDICINE

## 2025-07-16 PROCEDURE — 1160F RVW MEDS BY RX/DR IN RCRD: CPT | Mod: CPTII,S$GLB,, | Performed by: INTERNAL MEDICINE

## 2025-07-16 PROCEDURE — 3075F SYST BP GE 130 - 139MM HG: CPT | Mod: CPTII,S$GLB,, | Performed by: INTERNAL MEDICINE

## 2025-07-16 RX ORDER — CLOPIDOGREL BISULFATE 75 MG/1
75 TABLET ORAL DAILY
Qty: 90 TABLET | Refills: 1 | Status: SHIPPED | OUTPATIENT
Start: 2025-07-16

## 2025-07-16 RX ORDER — LOSARTAN POTASSIUM 100 MG/1
100 TABLET ORAL DAILY
Qty: 90 TABLET | Refills: 1 | Status: SHIPPED | OUTPATIENT
Start: 2025-07-16 | End: 2026-01-12

## 2025-07-16 RX ORDER — POLYETHYLENE GLYCOL 3350 17 G/17G
17 POWDER, FOR SOLUTION ORAL DAILY PRN
Qty: 10 EACH | Refills: 0 | Status: SHIPPED | OUTPATIENT
Start: 2025-07-16 | End: 2025-07-26

## 2025-07-16 RX ORDER — ROSUVASTATIN CALCIUM 10 MG/1
10 TABLET, COATED ORAL DAILY
Qty: 90 TABLET | Refills: 1 | Status: SHIPPED | OUTPATIENT
Start: 2025-07-16 | End: 2026-01-12

## 2025-07-16 RX ORDER — DOCUSATE SODIUM 100 MG/1
100 CAPSULE, LIQUID FILLED ORAL 2 TIMES DAILY
Qty: 20 CAPSULE | Refills: 0 | Status: SHIPPED | OUTPATIENT
Start: 2025-07-16 | End: 2025-07-26

## 2025-07-16 NOTE — PROGRESS NOTES
Chief Complaint  Chief Complaint   Patient presents with    Follow-up    Abdominal Pain        HPI  David Salter is a 64 y.o. male here today for the following: The primary encounter diagnosis was Generalized abdominal pain. Diagnoses of Constipation, unspecified constipation type, Essential hypertension, History of elevated glucose, Borderline high cholesterol, Hx of CABG, Annual wellness visit, Encounter for prostate cancer screening, and Encounter for screening for malignant neoplasm of colon were also pertinent to this visit.  Patient reported not experiencing significant side effects, except as noted in HPI if applicable.    Patient reported July 2025 had CABG done and they removed vein in left lower extremity.    July 2025: Patient and wife have good sense of humor and are good natured and patient understands it could benefit him to take medications such as cholesterol medications regularly.    Patient follows with:  Cardiology had CABG done and they removed vein and left lower extremity.    July 16, 2025.  Patient reported here for follow up left sided abdominal pain.  BP was at goal. MD counseled patient and answered questions.   MD obtained and reviewed records and discuss results with patient.  A1c prediabetes was 5.8 and pancreatic blood work was good July 2025.  MD counseled patient and answered questions.  LDL cholesterol was 151 July 2025 improved from 214 about 6 years ago and triglycerides were 200 July 2025 improved from 269 November 2022 and ultrasound July 2025 showed visualized parts of the pancreas were normal.  MD counseled patient and answered questions.  Patient agreed to restart medications including Crestor and take them regularly and patient was good natured and had a sense of humor about it.  Abdominal pain left-sided, constipation and KUB consistent with constipation.  Patient reported left-sided abdominal pain that began as discomfort some time ago but about the last 2 weeks  has been noticeable and is stable.  KUB done July 2025 was consistent with constipation and ultrasound was notable for gallbladder and visualized portions of the pancreas were normal.  Patient reported tried drinking plenty of water and eats vegetables but does not walk much and patient reported probably less than 5000 steps per day.  MD counseled patient and answered questions.  Patient agreed to referral to GI, MiraLax, Colace, increase walking, continue good hydration and vegetables.    MD counseled patient and answered questions.    July 11, 2025.  Patient here with wife and patient reported here for establish care, annual, left side pain.  BP was at goal. MD counseled patient and answered questions.   MD obtained and reviewed records and discuss results with patient.  A1c was 5.6 November 2022.  MD counseled patient and answered questions.  LDL cholesterol was 170 and triglycerides were 269 November 2022.  MD counseled patient and answered questions.  Abdominal pain.  Patient reported abdominal pain left upper quadrant that began about Monday, 5 days ago and is stable.  Patient reported does not drink significantly but when he was in his 20s they would drink about 4-6 beers on a setting night.  Patient denied history of gallstones and reported he believes he had some kidney stones when he was much younger in the past on his own but this is different and the pain starts in the abdomen.  Patient reported tried rest and seeing if it would go away and is stable.  MD counseled patient and answered questions.  Patient agreed to blood work including lipase and KUB and ultrasound.    MD counseled patient and answered questions.     PHQ-2 (Reflex to PHQ-9) Interpretation:  Over the last two weeks how often have you been bothered by little interest or pleasure in doing things: 0  Over the last two weeks how often have you been bothered by feeling down, depressed or hopeless: 0  PHQ-2 Total Score: 0       Review of  patient's allergies indicates:  No Known Allergies    MEDS:  Current Outpatient Medications   Medication Instructions    albuterol (VENTOLIN HFA) 90 mcg/actuation inhaler INHALE 2 PUFFS BY MOUTH INTO THE LUNGS TWICE DAILY AS DIRECTED    clopidogreL (PLAVIX) 75 mg, Oral, Daily    docusate sodium (COLACE) 100 mg, Oral, 2 times daily    fluticasone propionate (FLONASE) 50 mcg, Each Nostril, Daily    LIDOcaine (LIDODERM) 5 % 1 patch, Transdermal, Daily, Remove & Discard patch within 12 hours or as directed by MD.    losartan (COZAAR) 100 mg, Oral, Daily, TAKE 1 TABLET BY MOUTH EVERY DAY    nebivoloL (BYSTOLIC) 10 mg    polyethylene glycol (CLEARLAX) 17 g, Oral, Daily PRN    rosuvastatin (CRESTOR) 10 mg, Oral, Daily        Past Medical History:   Diagnosis Date    CHF (congestive heart failure)     Hypertension        Past Surgical History:   Procedure Laterality Date    CARDIAC SURGERY  11/02/2023    COLONOSCOPY N/A 10/02/2015    Procedure: COLONOSCOPY;  Surgeon: Byron Monroy III, MD;  Location: [facility];  Service: Endoscopy;  Laterality: N/A;       Family History   Problem Relation Name Age of Onset    Diabetes Father         Social History     Substance and Sexual Activity   Drug Use No       Review of Systems   Review of Systems   Constitutional:         As per HPI, otherwise negative.    HENT:          As per HPI, otherwise negative.    Eyes:         As per HPI, otherwise negative.    Respiratory:          As per HPI, otherwise negative.    Cardiovascular:         As per HPI, otherwise negative.    Gastrointestinal:         As per HPI, otherwise negative.    Genitourinary:         As per HPI, otherwise negative.    Musculoskeletal:         As per HPI, otherwise negative.    Skin:         As per HPI, otherwise negative.    Neurological:         As per HPI, otherwise negative.    Endo/Heme/Allergies:         As per HPI, otherwise negative.    Psychiatric/Behavioral:          As per HPI, otherwise negative.         Objective   Vitals:    07/16/25 1627   BP: 132/72   BP Location: Right arm   Patient Position: Sitting   Pulse: 72   Temp: 98.1 °F (36.7 °C)   SpO2: 96%   Weight: 84.4 kg (186 lb 1.1 oz)       Body mass index is 30.96 kg/m².    Physical Exam  HENT:      Head: Normocephalic.   Eyes:      Extraocular Movements: Extraocular movements intact.   Cardiovascular:      Rate and Rhythm: Regular rhythm.   Pulmonary:      Effort: No respiratory distress.   Abdominal:      Palpations: Abdomen is soft.   Musculoskeletal:      Cervical back: Neck supple.   Skin:     General: Skin is warm.   Neurological:      Mental Status: He is alert. Mental status is at baseline.         Recent labs:   No results found for this or any previous visit (from the past 24 hours).    Assessment / Plan  David was seen today for follow-up and abdominal pain.    Diagnoses and all orders for this visit:    Generalized abdominal pain  Comments:  New problem July 2025 in the setting of constipation and likely related to it, stable, MiraLax, Colace and referral to GI.  Orders:  -     polyethylene glycol (CLEARLAX) 17 gram/dose powder; Take 17 g by mouth daily as needed for Constipation.  -     docusate sodium (COLACE) 100 MG capsule; Take 1 capsule (100 mg total) by mouth 2 (two) times daily. for 10 days  -     Ambulatory referral/consult to Gastroenterology; Future    Constipation, unspecified constipation type  Comments:  Chronic, stable, MiraLax Colace and referral to GI.  Orders:  -     polyethylene glycol (CLEARLAX) 17 gram/dose powder; Take 17 g by mouth daily as needed for Constipation.  -     docusate sodium (COLACE) 100 MG capsule; Take 1 capsule (100 mg total) by mouth 2 (two) times daily. for 10 days    Essential hypertension  Comments:  Chronic, stable, losartan.  Orders:  -     Lipid Panel; Future  -     losartan (COZAAR) 100 MG tablet; Take 1 tablet (100 mg total) by mouth once daily. TAKE 1 TABLET BY MOUTH EVERY DAY    History of  elevated glucose  -     Hemoglobin A1C; Future    Borderline high cholesterol  Comments:  Chronic, stable, rosuvastatin.  Orders:  -     Lipid Panel; Future  -     rosuvastatin (CRESTOR) 10 MG tablet; Take 1 tablet (10 mg total) by mouth once daily.    Hx of CABG  Comments:  November 2022. Chronic, stable, Crestor.  Orders:  -     clopidogreL (PLAVIX) 75 mg tablet; Take 1 tablet (75 mg total) by mouth once daily.    Annual wellness visit  -     Hemoglobin A1C; Future  -     Lipid Panel; Future  -     PSA, Screening; Future    Encounter for prostate cancer screening  -     PSA, Screening; Future    Encounter for screening for malignant neoplasm of colon  -     Ambulatory referral/consult to Gastroenterology; Future         Follow up in about 6 months (around 1/16/2026), or if symptoms worsen or fail to improve.

## 2025-07-17 ENCOUNTER — TELEPHONE (OUTPATIENT)
Dept: INTERNAL MEDICINE | Facility: CLINIC | Age: 65
End: 2025-07-17
Payer: COMMERCIAL

## 2025-07-17 DIAGNOSIS — R10.84 GENERALIZED ABDOMINAL PAIN: Primary | ICD-10-CM

## 2025-07-17 DIAGNOSIS — R10.9 LEFT FLANK PAIN: ICD-10-CM

## 2025-07-17 NOTE — TELEPHONE ENCOUNTER
Referral to GI. Next Available appointment isnt until 09/10/25. Per Dr Durant, I called [facility] and pt can see Dr Rene Montemayor on 07/23/2025, Dr Durant will put a new referral in the system and I  will fax it to them along with office notes, lab, xrays, and scans. Fax [phone]

## 2025-08-19 ENCOUNTER — OFFICE VISIT (OUTPATIENT)
Dept: INTERNAL MEDICINE | Facility: CLINIC | Age: 65
End: 2025-08-19
Payer: COMMERCIAL

## 2025-08-19 VITALS
BODY MASS INDEX: 31.63 KG/M2 | SYSTOLIC BLOOD PRESSURE: 118 MMHG | OXYGEN SATURATION: 99 % | HEART RATE: 68 BPM | DIASTOLIC BLOOD PRESSURE: 62 MMHG | HEIGHT: 65 IN | TEMPERATURE: 98 F | WEIGHT: 189.81 LBS

## 2025-08-19 DIAGNOSIS — L98.9 SKIN LESION OF LEFT ARM: Chronic | ICD-10-CM

## 2025-08-19 DIAGNOSIS — J34.89 NASAL DISCHARGE: Primary | ICD-10-CM

## 2025-08-19 DIAGNOSIS — R50.9 FEVER, UNSPECIFIED FEVER CAUSE: ICD-10-CM

## 2025-08-19 DIAGNOSIS — R22.9 LUMP OF SKIN: Chronic | ICD-10-CM

## 2025-08-19 LAB
CTP QC/QA: YES
CTP QC/QA: YES
POC MOLECULAR INFLUENZA A AGN: NEGATIVE
POC MOLECULAR INFLUENZA B AGN: NEGATIVE
SARS-COV-2 RDRP RESP QL NAA+PROBE: NEGATIVE

## 2025-08-19 PROCEDURE — 3078F DIAST BP <80 MM HG: CPT | Mod: CPTII,S$GLB,, | Performed by: INTERNAL MEDICINE

## 2025-08-19 PROCEDURE — 1159F MED LIST DOCD IN RCRD: CPT | Mod: CPTII,S$GLB,, | Performed by: INTERNAL MEDICINE

## 2025-08-19 PROCEDURE — 3008F BODY MASS INDEX DOCD: CPT | Mod: CPTII,S$GLB,, | Performed by: INTERNAL MEDICINE

## 2025-08-19 PROCEDURE — 3044F HG A1C LEVEL LT 7.0%: CPT | Mod: CPTII,S$GLB,, | Performed by: INTERNAL MEDICINE

## 2025-08-19 PROCEDURE — 3074F SYST BP LT 130 MM HG: CPT | Mod: CPTII,S$GLB,, | Performed by: INTERNAL MEDICINE

## 2025-08-19 PROCEDURE — 99999 PR PBB SHADOW E&M-EST. PATIENT-LVL IV: CPT | Mod: PBBFAC,,, | Performed by: INTERNAL MEDICINE

## 2025-08-19 PROCEDURE — 87635 SARS-COV-2 COVID-19 AMP PRB: CPT | Mod: QW,S$GLB,, | Performed by: INTERNAL MEDICINE

## 2025-08-19 PROCEDURE — G2211 COMPLEX E/M VISIT ADD ON: HCPCS | Mod: S$GLB,,, | Performed by: INTERNAL MEDICINE

## 2025-08-19 PROCEDURE — 4010F ACE/ARB THERAPY RXD/TAKEN: CPT | Mod: CPTII,S$GLB,, | Performed by: INTERNAL MEDICINE

## 2025-08-19 PROCEDURE — 87502 INFLUENZA DNA AMP PROBE: CPT | Mod: QW,S$GLB,, | Performed by: INTERNAL MEDICINE

## 2025-08-19 PROCEDURE — 99214 OFFICE O/P EST MOD 30 MIN: CPT | Mod: S$GLB,,, | Performed by: INTERNAL MEDICINE

## 2025-08-19 PROCEDURE — 1160F RVW MEDS BY RX/DR IN RCRD: CPT | Mod: CPTII,S$GLB,, | Performed by: INTERNAL MEDICINE

## 2025-08-19 RX ORDER — PREDNISONE 10 MG/1
10 TABLET ORAL DAILY
Qty: 5 TABLET | Refills: 0 | Status: SHIPPED | OUTPATIENT
Start: 2025-08-19 | End: 2025-08-24

## 2025-08-19 RX ORDER — AZITHROMYCIN 250 MG/1
TABLET, FILM COATED ORAL
Qty: 6 TABLET | Refills: 0 | Status: SHIPPED | OUTPATIENT
Start: 2025-08-19

## 2025-08-20 ENCOUNTER — HOSPITAL ENCOUNTER (OUTPATIENT)
Dept: RADIOLOGY | Facility: HOSPITAL | Age: 65
Discharge: HOME | End: 2025-08-20
Attending: INTERNAL MEDICINE
Payer: COMMERCIAL

## 2025-08-20 ENCOUNTER — OFFICE VISIT (OUTPATIENT)
Dept: ORTHOPEDICS | Facility: CLINIC | Age: 65
End: 2025-08-20
Payer: COMMERCIAL

## 2025-08-20 ENCOUNTER — PATIENT MESSAGE (OUTPATIENT)
Dept: ORTHOPEDICS | Facility: CLINIC | Age: 65
End: 2025-08-20

## 2025-08-20 ENCOUNTER — TELEPHONE (OUTPATIENT)
Dept: INTERNAL MEDICINE | Facility: CLINIC | Age: 65
End: 2025-08-20
Payer: COMMERCIAL

## 2025-08-20 ENCOUNTER — PATIENT MESSAGE (OUTPATIENT)
Dept: CARDIOLOGY | Facility: CLINIC | Age: 65
End: 2025-08-20
Payer: COMMERCIAL

## 2025-08-20 VITALS — WEIGHT: 189.81 LBS | HEIGHT: 65 IN | BODY MASS INDEX: 31.63 KG/M2

## 2025-08-20 DIAGNOSIS — R22.9 LUMP OF SKIN: Chronic | ICD-10-CM

## 2025-08-20 DIAGNOSIS — I99.9 VASCULAR ABNORMALITY: ICD-10-CM

## 2025-08-20 DIAGNOSIS — L98.9 SKIN LESION OF LEFT ARM: Chronic | ICD-10-CM

## 2025-08-20 DIAGNOSIS — R22.9 LUMPS ON THE SKIN: Primary | ICD-10-CM

## 2025-08-20 PROCEDURE — 1159F MED LIST DOCD IN RCRD: CPT | Mod: CPTII,S$GLB,, | Performed by: NURSE PRACTITIONER

## 2025-08-20 PROCEDURE — 3044F HG A1C LEVEL LT 7.0%: CPT | Mod: CPTII,S$GLB,, | Performed by: NURSE PRACTITIONER

## 2025-08-20 PROCEDURE — 76882 US LMTD JT/FCL EVL NVASC XTR: CPT | Mod: TC,PO,LT

## 2025-08-20 PROCEDURE — 76882 US LMTD JT/FCL EVL NVASC XTR: CPT | Mod: 26,LT,, | Performed by: RADIOLOGY

## 2025-08-20 PROCEDURE — 4010F ACE/ARB THERAPY RXD/TAKEN: CPT | Mod: CPTII,S$GLB,, | Performed by: NURSE PRACTITIONER

## 2025-08-20 PROCEDURE — 99203 OFFICE O/P NEW LOW 30 MIN: CPT | Mod: S$GLB,,, | Performed by: NURSE PRACTITIONER

## 2025-08-20 PROCEDURE — 3008F BODY MASS INDEX DOCD: CPT | Mod: CPTII,S$GLB,, | Performed by: NURSE PRACTITIONER

## 2025-08-20 PROCEDURE — 99999 PR PBB SHADOW E&M-EST. PATIENT-LVL III: CPT | Mod: PBBFAC,,, | Performed by: NURSE PRACTITIONER

## 2025-08-22 ENCOUNTER — TELEPHONE (OUTPATIENT)
Dept: CARDIOLOGY | Facility: CLINIC | Age: 65
End: 2025-08-22
Payer: COMMERCIAL

## 2025-08-25 PROBLEM — I72.9 PSEUDOANEURYSM: Status: ACTIVE | Noted: 2025-08-25

## 2025-08-25 PROBLEM — R10.9 LEFT FLANK PAIN: Status: RESOLVED | Noted: 2025-07-11 | Resolved: 2025-08-25
